# Patient Record
Sex: FEMALE | Race: WHITE | Employment: OTHER | URBAN - METROPOLITAN AREA
[De-identification: names, ages, dates, MRNs, and addresses within clinical notes are randomized per-mention and may not be internally consistent; named-entity substitution may affect disease eponyms.]

---

## 2018-03-12 DIAGNOSIS — M43.10 DEGENERATIVE SPONDYLOLISTHESIS: ICD-10-CM

## 2018-03-12 DIAGNOSIS — M41.50 DEGENERATIVE SCOLIOSIS: Primary | ICD-10-CM

## 2018-03-12 DIAGNOSIS — G95.19 NEUROGENIC CLAUDICATION (HCC): ICD-10-CM

## 2018-03-12 DIAGNOSIS — Q76.49 SPINAL DEFORMITY: ICD-10-CM

## 2018-03-27 ENCOUNTER — PREP FOR PROCEDURE (OUTPATIENT)
Dept: NEUROSURGERY | Age: 78
End: 2018-03-27

## 2018-03-27 DIAGNOSIS — M48.062 SPINAL STENOSIS OF LUMBAR REGION WITH NEUROGENIC CLAUDICATION: Primary | ICD-10-CM

## 2018-03-27 RX ORDER — SODIUM CHLORIDE 9 MG/ML
INJECTION, SOLUTION INTRAVENOUS CONTINUOUS
Status: CANCELLED | OUTPATIENT
Start: 2018-03-27

## 2018-03-27 RX ORDER — SODIUM CHLORIDE 0.9 % (FLUSH) 0.9 %
10 SYRINGE (ML) INJECTION PRN
Status: CANCELLED | OUTPATIENT
Start: 2018-03-27

## 2018-03-27 RX ORDER — SODIUM CHLORIDE 0.9 % (FLUSH) 0.9 %
10 SYRINGE (ML) INJECTION EVERY 12 HOURS SCHEDULED
Status: CANCELLED | OUTPATIENT
Start: 2018-03-27

## 2018-04-10 ENCOUNTER — ANESTHESIA EVENT (OUTPATIENT)
Dept: OPERATING ROOM | Age: 78
DRG: 453 | End: 2018-04-10
Payer: MEDICARE

## 2018-04-10 ENCOUNTER — HOSPITAL ENCOUNTER (OUTPATIENT)
Dept: PREADMISSION TESTING | Age: 78
Discharge: HOME | End: 2018-04-10
Payer: MEDICARE

## 2018-04-10 ENCOUNTER — HOSPITAL ENCOUNTER (OUTPATIENT)
Dept: GENERAL RADIOLOGY | Age: 78
Discharge: HOME | End: 2018-04-12
Payer: MEDICARE

## 2018-04-10 VITALS
OXYGEN SATURATION: 97 % | DIASTOLIC BLOOD PRESSURE: 83 MMHG | TEMPERATURE: 97.7 F | HEART RATE: 85 BPM | BODY MASS INDEX: 23.74 KG/M2 | HEIGHT: 63 IN | RESPIRATION RATE: 18 BRPM | WEIGHT: 134 LBS | SYSTOLIC BLOOD PRESSURE: 126 MMHG

## 2018-04-10 DIAGNOSIS — Z01.812 PRE-OPERATIVE LABORATORY EXAMINATION: Primary | ICD-10-CM

## 2018-04-10 DIAGNOSIS — M48.062 SPINAL STENOSIS OF LUMBAR REGION WITH NEUROGENIC CLAUDICATION: ICD-10-CM

## 2018-04-10 LAB
ABO/RH: NORMAL
ANION GAP SERPL CALCULATED.3IONS-SCNC: 18 MMOL/L (ref 7–16)
ANTIBODY SCREEN: NORMAL
BACTERIA: ABNORMAL /HPF
BILIRUBIN URINE: NEGATIVE
BLOOD, URINE: NEGATIVE
BUN BLDV-MCNC: 33 MG/DL (ref 8–23)
CALCIUM SERPL-MCNC: 10.3 MG/DL (ref 8.6–10.2)
CHLORIDE BLD-SCNC: 100 MMOL/L (ref 98–107)
CLARITY: CLEAR
CO2: 20 MMOL/L (ref 22–29)
COLOR: YELLOW
CREAT SERPL-MCNC: 1.2 MG/DL (ref 0.5–1)
GFR AFRICAN AMERICAN: 53
GFR NON-AFRICAN AMERICAN: 44 ML/MIN/1.73
GLUCOSE BLD-MCNC: 106 MG/DL (ref 74–109)
GLUCOSE URINE: NEGATIVE MG/DL
HCT VFR BLD CALC: 31.5 % (ref 34–48)
HEMOGLOBIN: 10.1 G/DL (ref 11.5–15.5)
KETONES, URINE: NEGATIVE MG/DL
LEUKOCYTE ESTERASE, URINE: ABNORMAL
MCH RBC QN AUTO: 29.4 PG (ref 26–35)
MCHC RBC AUTO-ENTMCNC: 32.1 % (ref 32–34.5)
MCV RBC AUTO: 91.6 FL (ref 80–99.9)
NITRITE, URINE: NEGATIVE
PDW BLD-RTO: 14.8 FL (ref 11.5–15)
PH UA: 5 (ref 5–9)
PLATELET # BLD: 315 E9/L (ref 130–450)
PMV BLD AUTO: 8.9 FL (ref 7–12)
POTASSIUM REFLEX MAGNESIUM: 4.8 MMOL/L (ref 3.5–5)
PROTEIN UA: NEGATIVE MG/DL
RBC # BLD: 3.44 E12/L (ref 3.5–5.5)
RBC UA: ABNORMAL /HPF (ref 0–2)
RENAL EPITHELIAL, UA: ABNORMAL /HPF
SODIUM BLD-SCNC: 138 MMOL/L (ref 132–146)
SPECIFIC GRAVITY UA: 1.02 (ref 1–1.03)
UROBILINOGEN, URINE: 0.2 E.U./DL
WBC # BLD: 9.4 E9/L (ref 4.5–11.5)
WBC UA: ABNORMAL /HPF (ref 0–5)

## 2018-04-10 PROCEDURE — 80048 BASIC METABOLIC PNL TOTAL CA: CPT

## 2018-04-10 PROCEDURE — 36415 COLL VENOUS BLD VENIPUNCTURE: CPT

## 2018-04-10 PROCEDURE — 85027 COMPLETE CBC AUTOMATED: CPT

## 2018-04-10 PROCEDURE — 86920 COMPATIBILITY TEST SPIN: CPT

## 2018-04-10 PROCEDURE — 86901 BLOOD TYPING SEROLOGIC RH(D): CPT

## 2018-04-10 PROCEDURE — 87081 CULTURE SCREEN ONLY: CPT

## 2018-04-10 PROCEDURE — 86850 RBC ANTIBODY SCREEN: CPT

## 2018-04-10 PROCEDURE — 71046 X-RAY EXAM CHEST 2 VIEWS: CPT

## 2018-04-10 PROCEDURE — 81001 URINALYSIS AUTO W/SCOPE: CPT

## 2018-04-10 PROCEDURE — 86900 BLOOD TYPING SEROLOGIC ABO: CPT

## 2018-04-10 RX ORDER — BIMATOPROST 0.01 %
1 DROPS OPHTHALMIC (EYE) NIGHTLY
Refills: 11 | COMMUNITY
Start: 2018-02-26

## 2018-04-10 RX ORDER — KETOCONAZOLE 20 MG/ML
1 SHAMPOO TOPICAL
Refills: 0 | COMMUNITY
Start: 2018-01-29

## 2018-04-10 RX ORDER — BRIMONIDINE TARTRATE/TIMOLOL 0.2%-0.5%
1 DROPS OPHTHALMIC (EYE) 2 TIMES DAILY
Refills: 4 | COMMUNITY
Start: 2018-02-26

## 2018-04-10 RX ORDER — MELOXICAM 15 MG/1
15 TABLET ORAL DAILY
Refills: 3 | Status: ON HOLD | COMMUNITY
Start: 2018-03-11 | End: 2018-04-20 | Stop reason: HOSPADM

## 2018-04-10 ASSESSMENT — PAIN DESCRIPTION - DESCRIPTORS: DESCRIPTORS: CONSTANT;SHOOTING

## 2018-04-10 ASSESSMENT — PAIN DESCRIPTION - ORIENTATION: ORIENTATION: LOWER;RIGHT;LEFT

## 2018-04-10 ASSESSMENT — PAIN DESCRIPTION - PAIN TYPE: TYPE: CHRONIC PAIN

## 2018-04-10 ASSESSMENT — PAIN DESCRIPTION - DIRECTION: RADIATING_TOWARDS: LEGS

## 2018-04-10 ASSESSMENT — PAIN DESCRIPTION - LOCATION: LOCATION: BACK

## 2018-04-10 ASSESSMENT — PAIN SCALES - GENERAL: PAINLEVEL_OUTOF10: 5

## 2018-04-10 ASSESSMENT — PAIN DESCRIPTION - PROGRESSION: CLINICAL_PROGRESSION: GRADUALLY WORSENING

## 2018-04-10 ASSESSMENT — PAIN DESCRIPTION - FREQUENCY: FREQUENCY: CONTINUOUS

## 2018-04-11 LAB — MRSA CULTURE ONLY: NORMAL

## 2018-04-17 ENCOUNTER — HOSPITAL ENCOUNTER (INPATIENT)
Age: 78
LOS: 13 days | Discharge: NURSING, REHAB OR LONG-TERM CARE | DRG: 453 | End: 2018-04-30
Attending: NEUROLOGICAL SURGERY | Admitting: NEUROLOGICAL SURGERY
Payer: MEDICARE

## 2018-04-17 ENCOUNTER — APPOINTMENT (OUTPATIENT)
Dept: GENERAL RADIOLOGY | Age: 78
DRG: 453 | End: 2018-04-17
Attending: NEUROLOGICAL SURGERY
Payer: MEDICARE

## 2018-04-17 ENCOUNTER — ANESTHESIA (OUTPATIENT)
Dept: OPERATING ROOM | Age: 78
DRG: 453 | End: 2018-04-17
Payer: MEDICARE

## 2018-04-17 VITALS — RESPIRATION RATE: 23 BRPM | TEMPERATURE: 98.2 F | OXYGEN SATURATION: 100 %

## 2018-04-17 DIAGNOSIS — M54.9 BACK PAIN, UNSPECIFIED BACK LOCATION, UNSPECIFIED BACK PAIN LATERALITY, UNSPECIFIED CHRONICITY: ICD-10-CM

## 2018-04-17 PROBLEM — M48.062 LUMBAR STENOSIS WITH NEUROGENIC CLAUDICATION: Status: ACTIVE | Noted: 2018-04-17

## 2018-04-17 LAB
B.E.: -4.6 MMOL/L (ref -3–0)
B.E.: -6.7 MMOL/L (ref -3–0)
B.E.: -7.6 MMOL/L (ref -3–0)
B.E.: -8 MMOL/L (ref -3–0)
BLOOD BANK DISPENSE STATUS: NORMAL
BLOOD BANK DISPENSE STATUS: NORMAL
BLOOD BANK PRODUCT CODE: NORMAL
BLOOD BANK PRODUCT CODE: NORMAL
BPU ID: NORMAL
BPU ID: NORMAL
CARDIOPULMONARY BYPASS: NO
DESCRIPTION BLOOD BANK: NORMAL
DESCRIPTION BLOOD BANK: NORMAL
DEVICE: ABNORMAL
FIBRINOGEN: 370 MG/DL (ref 225–540)
FIO2 ARTERIAL: 60
HCO3 ARTERIAL: 17.9 MMOL/L (ref 22–26)
HCO3 ARTERIAL: 18.2 MMOL/L (ref 22–26)
HCO3 ARTERIAL: 18.5 MMOL/L (ref 22–26)
HCO3 ARTERIAL: 20.4 MMOL/L (ref 22–26)
HCT,ARTERIAL: 22 % (ref 34–48)
HCT,ARTERIAL: 23 % (ref 34–48)
HCT,ARTERIAL: 29 % (ref 34–48)
HCT,ARTERIAL: 29 % (ref 34–48)
HGB, ARTERIAL: 7.4 G/DL (ref 11.5–15.5)
HGB, ARTERIAL: 8 G/DL (ref 11.5–15.5)
HGB, ARTERIAL: 9.7 G/DL (ref 11.5–15.5)
HGB, ARTERIAL: 9.8 G/DL (ref 11.5–15.5)
INR BLD: 1.2
O2 SATURATION: 99.9 % (ref 92–98.5)
OPERATOR ID: ABNORMAL
PCO2 ARTERIAL: 35 MMHG (ref 35–45)
PCO2 ARTERIAL: 36.1 MMHG (ref 35–45)
PCO2 ARTERIAL: 37.2 MMHG (ref 35–45)
PCO2 ARTERIAL: 37.2 MMHG (ref 35–45)
PH BLOOD GAS: 7.29 (ref 7.35–7.45)
PH BLOOD GAS: 7.3 (ref 7.35–7.45)
PH BLOOD GAS: 7.33 (ref 7.35–7.45)
PH BLOOD GAS: 7.36 (ref 7.35–7.45)
PO2 ARTERIAL: 333.3 MMHG (ref 60–80)
PO2 ARTERIAL: 333.4 MMHG (ref 60–80)
PO2 ARTERIAL: 340.2 MMHG (ref 60–80)
PO2 ARTERIAL: 343.1 MMHG (ref 60–80)
POTASSIUM SERPL-SCNC: 3.9 MMOL/L (ref 3.5–5.5)
POTASSIUM SERPL-SCNC: 4.1 MMOL/L (ref 3.5–5.5)
POTASSIUM SERPL-SCNC: 4.3 MMOL/L (ref 3.5–5.5)
POTASSIUM SERPL-SCNC: 4.4 MMOL/L (ref 3.5–5.5)
PROTHROMBIN TIME: 13.3 SEC (ref 9.3–12.4)
SOURCE, BLOOD GAS: ABNORMAL

## 2018-04-17 PROCEDURE — 95939 C MOTOR EVOKED UPR&LWR LIMBS: CPT | Performed by: AUDIOLOGIST

## 2018-04-17 PROCEDURE — 82803 BLOOD GASES ANY COMBINATION: CPT

## 2018-04-17 PROCEDURE — C1713 ANCHOR/SCREW BN/BN,TIS/BN: HCPCS | Performed by: NEUROLOGICAL SURGERY

## 2018-04-17 PROCEDURE — 0RG7071 FUSION OF 2 TO 7 THORACIC VERTEBRAL JOINTS WITH AUTOLOGOUS TISSUE SUBSTITUTE, POSTERIOR APPROACH, POSTERIOR COLUMN, OPEN APPROACH: ICD-10-PCS | Performed by: NEUROLOGICAL SURGERY

## 2018-04-17 PROCEDURE — 4A11X4G MONITORING OF PERIPHERAL NERVOUS ELECTRICAL ACTIVITY, INTRAOPERATIVE, EXTERNAL APPROACH: ICD-10-PCS | Performed by: NEUROLOGICAL SURGERY

## 2018-04-17 PROCEDURE — 36415 COLL VENOUS BLD VENIPUNCTURE: CPT

## 2018-04-17 PROCEDURE — 94002 VENT MGMT INPAT INIT DAY: CPT

## 2018-04-17 PROCEDURE — 2580000003 HC RX 258: Performed by: PHYSICIAN ASSISTANT

## 2018-04-17 PROCEDURE — 22848 INSERT PELV FIXATION DEVICE: CPT | Performed by: PHYSICIAN ASSISTANT

## 2018-04-17 PROCEDURE — 36620 INSERTION CATHETER ARTERY: CPT

## 2018-04-17 PROCEDURE — 6360000002 HC RX W HCPCS: Performed by: ANESTHESIOLOGY

## 2018-04-17 PROCEDURE — L8699 PROSTHETIC IMPLANT NOS: HCPCS | Performed by: NEUROLOGICAL SURGERY

## 2018-04-17 PROCEDURE — 0SG3071 FUSION OF LUMBOSACRAL JOINT WITH AUTOLOGOUS TISSUE SUBSTITUTE, POSTERIOR APPROACH, POSTERIOR COLUMN, OPEN APPROACH: ICD-10-PCS | Performed by: NEUROLOGICAL SURGERY

## 2018-04-17 PROCEDURE — P9041 ALBUMIN (HUMAN),5%, 50ML: HCPCS

## 2018-04-17 PROCEDURE — 2500000003 HC RX 250 WO HCPCS

## 2018-04-17 PROCEDURE — 6360000002 HC RX W HCPCS: Performed by: NEUROLOGICAL SURGERY

## 2018-04-17 PROCEDURE — 22214 INCIS 1 VERTEBRAL SEG LUMBAR: CPT | Performed by: NEUROLOGICAL SURGERY

## 2018-04-17 PROCEDURE — 0QB30ZZ EXCISION OF LEFT PELVIC BONE, OPEN APPROACH: ICD-10-PCS | Performed by: NEUROLOGICAL SURGERY

## 2018-04-17 PROCEDURE — 61783 SCAN PROC SPINAL: CPT | Performed by: NEUROLOGICAL SURGERY

## 2018-04-17 PROCEDURE — 7100000001 HC PACU RECOVERY - ADDTL 15 MIN: Performed by: NEUROLOGICAL SURGERY

## 2018-04-17 PROCEDURE — 88304 TISSUE EXAM BY PATHOLOGIST: CPT

## 2018-04-17 PROCEDURE — 0SB20ZZ EXCISION OF LUMBAR VERTEBRAL DISC, OPEN APPROACH: ICD-10-PCS | Performed by: NEUROLOGICAL SURGERY

## 2018-04-17 PROCEDURE — 7100000000 HC PACU RECOVERY - FIRST 15 MIN: Performed by: NEUROLOGICAL SURGERY

## 2018-04-17 PROCEDURE — 6360000002 HC RX W HCPCS

## 2018-04-17 PROCEDURE — 0SG10AJ FUSION OF 2 OR MORE LUMBAR VERTEBRAL JOINTS WITH INTERBODY FUSION DEVICE, POSTERIOR APPROACH, ANTERIOR COLUMN, OPEN APPROACH: ICD-10-PCS | Performed by: NEUROLOGICAL SURGERY

## 2018-04-17 PROCEDURE — 63017 REMOVE SPINE LAMINA >2 LMBR: CPT | Performed by: NEUROLOGICAL SURGERY

## 2018-04-17 PROCEDURE — 22843 INSERT SPINE FIXATION DEVICE: CPT | Performed by: PHYSICIAN ASSISTANT

## 2018-04-17 PROCEDURE — 63017 REMOVE SPINE LAMINA >2 LMBR: CPT | Performed by: PHYSICIAN ASSISTANT

## 2018-04-17 PROCEDURE — 85384 FIBRINOGEN ACTIVITY: CPT

## 2018-04-17 PROCEDURE — 22853 INSJ BIOMECHANICAL DEVICE: CPT | Performed by: NEUROLOGICAL SURGERY

## 2018-04-17 PROCEDURE — 0QH304Z INSERTION OF INTERNAL FIXATION DEVICE INTO LEFT PELVIC BONE, OPEN APPROACH: ICD-10-PCS | Performed by: NEUROLOGICAL SURGERY

## 2018-04-17 PROCEDURE — 76000 FLUOROSCOPY <1 HR PHYS/QHP: CPT

## 2018-04-17 PROCEDURE — 3600000002 HC SURGERY LEVEL 2 BASE: Performed by: NEUROLOGICAL SURGERY

## 2018-04-17 PROCEDURE — 6370000000 HC RX 637 (ALT 250 FOR IP): Performed by: NEUROLOGICAL SURGERY

## 2018-04-17 PROCEDURE — 3700000000 HC ANESTHESIA ATTENDED CARE: Performed by: NEUROLOGICAL SURGERY

## 2018-04-17 PROCEDURE — 95938 SOMATOSENSORY TESTING: CPT | Performed by: AUDIOLOGIST

## 2018-04-17 PROCEDURE — 22214 INCIS 1 VERTEBRAL SEG LUMBAR: CPT | Performed by: PHYSICIAN ASSISTANT

## 2018-04-17 PROCEDURE — 85610 PROTHROMBIN TIME: CPT

## 2018-04-17 PROCEDURE — 22848 INSERT PELV FIXATION DEVICE: CPT | Performed by: NEUROLOGICAL SURGERY

## 2018-04-17 PROCEDURE — 2720000010 HC SURG SUPPLY STERILE: Performed by: NEUROLOGICAL SURGERY

## 2018-04-17 PROCEDURE — 22853 INSJ BIOMECHANICAL DEVICE: CPT | Performed by: PHYSICIAN ASSISTANT

## 2018-04-17 PROCEDURE — 88311 DECALCIFY TISSUE: CPT

## 2018-04-17 PROCEDURE — 6360000002 HC RX W HCPCS: Performed by: PHYSICIAN ASSISTANT

## 2018-04-17 PROCEDURE — 22802 ARTHRD PST DFRM 7-12 VRT SGM: CPT | Performed by: PHYSICIAN ASSISTANT

## 2018-04-17 PROCEDURE — 2580000003 HC RX 258

## 2018-04-17 PROCEDURE — 3600000012 HC SURGERY LEVEL 2 ADDTL 15MIN: Performed by: NEUROLOGICAL SURGERY

## 2018-04-17 PROCEDURE — 8E0WXBF COMPUTER ASSISTED PROCEDURE OF TRUNK REGION, WITH FLUOROSCOPY: ICD-10-PCS | Performed by: NEUROLOGICAL SURGERY

## 2018-04-17 PROCEDURE — 22802 ARTHRD PST DFRM 7-12 VRT SGM: CPT | Performed by: NEUROLOGICAL SURGERY

## 2018-04-17 PROCEDURE — 22216 INCIS ADDL SPINE SEGMENT: CPT | Performed by: NEUROLOGICAL SURGERY

## 2018-04-17 PROCEDURE — 22843 INSERT SPINE FIXATION DEVICE: CPT | Performed by: NEUROLOGICAL SURGERY

## 2018-04-17 PROCEDURE — 2500000003 HC RX 250 WO HCPCS: Performed by: NEUROLOGICAL SURGERY

## 2018-04-17 PROCEDURE — 3700000001 HC ADD 15 MINUTES (ANESTHESIA): Performed by: NEUROLOGICAL SURGERY

## 2018-04-17 PROCEDURE — 1200000000 HC SEMI PRIVATE

## 2018-04-17 PROCEDURE — P9016 RBC LEUKOCYTES REDUCED: HCPCS

## 2018-04-17 PROCEDURE — 95940 IONM IN OPERATNG ROOM 15 MIN: CPT | Performed by: AUDIOLOGIST

## 2018-04-17 PROCEDURE — 0SG507Z FUSION OF SACROCOCCYGEAL JOINT WITH AUTOLOGOUS TISSUE SUBSTITUTE, OPEN APPROACH: ICD-10-PCS | Performed by: NEUROLOGICAL SURGERY

## 2018-04-17 PROCEDURE — 2709999900 HC NON-CHARGEABLE SUPPLY: Performed by: NEUROLOGICAL SURGERY

## 2018-04-17 PROCEDURE — 20937 SP BONE AGRFT MORSEL ADD-ON: CPT | Performed by: NEUROLOGICAL SURGERY

## 2018-04-17 PROCEDURE — 20937 SP BONE AGRFT MORSEL ADD-ON: CPT | Performed by: PHYSICIAN ASSISTANT

## 2018-04-17 PROCEDURE — 22216 INCIS ADDL SPINE SEGMENT: CPT | Performed by: PHYSICIAN ASSISTANT

## 2018-04-17 DEVICE — SCREW 55840005545 5.5/6 MAS 5.5X45 CC
Type: IMPLANTABLE DEVICE | Site: BACK | Status: FUNCTIONAL
Brand: CD HORIZON® SPINAL SYSTEM

## 2018-04-17 DEVICE — SCREW 55840005550 5.5/6 MAS 5.5X50 CC
Type: IMPLANTABLE DEVICE | Site: BACK | Status: FUNCTIONAL
Brand: CD HORIZON® SPINAL SYSTEM

## 2018-04-17 DEVICE — DBM 7509211 MAGNIFUSE 1 X 10CM
Type: IMPLANTABLE DEVICE | Site: BACK | Status: FUNCTIONAL
Brand: MAGNIFUSE® BONE GRAFT

## 2018-04-17 DEVICE — CROSSLINK 811-322 LP MLTSP PL L 1.752.15
Type: IMPLANTABLE DEVICE | Site: BACK | Status: FUNCTIONAL
Brand: TSRH® SPINAL SYSTEM

## 2018-04-17 DEVICE — ROD 1554200500 5.5 TIAL NS STRT LN 500MM
Type: IMPLANTABLE DEVICE | Site: BACK | Status: FUNCTIONAL
Brand: CD HORIZON® SPINAL SYSTEM

## 2018-04-17 DEVICE — SPACER 4011022 12 DEG 10X22
Type: IMPLANTABLE DEVICE | Site: BACK | Status: FUNCTIONAL
Brand: CAPSTONE CONTROL™ SPINAL SYSTEM

## 2018-04-17 DEVICE — SCREW 55840006545 5.5/6 MAS 6.5X45 CC .
Type: IMPLANTABLE DEVICE | Site: BACK | Status: FUNCTIONAL
Brand: CD HORIZON® SPINAL SYSTEM

## 2018-04-17 DEVICE — SCREW 55840005540 5.5/6 MAS 5.5X40 CC
Type: IMPLANTABLE DEVICE | Site: BACK | Status: FUNCTIONAL
Brand: CD HORIZON® SPINAL SYSTEM

## 2018-04-17 DEVICE — CROSSLINK 811-307 TI 5.5MM 1.375 INCH
Type: IMPLANTABLE DEVICE | Site: BACK | Status: FUNCTIONAL
Brand: TSRH® SPINAL SYSTEM

## 2018-04-17 RX ORDER — LIDOCAINE HYDROCHLORIDE 20 MG/ML
INJECTION, SOLUTION INFILTRATION; PERINEURAL PRN
Status: DISCONTINUED | OUTPATIENT
Start: 2018-04-17 | End: 2018-04-17 | Stop reason: SDUPTHER

## 2018-04-17 RX ORDER — HYDROMORPHONE HCL 110MG/55ML
0.25 PATIENT CONTROLLED ANALGESIA SYRINGE INTRAVENOUS EVERY 5 MIN PRN
Status: DISCONTINUED | OUTPATIENT
Start: 2018-04-17 | End: 2018-04-17

## 2018-04-17 RX ORDER — LATANOPROST 50 UG/ML
1 SOLUTION/ DROPS OPHTHALMIC NIGHTLY
Status: DISCONTINUED | OUTPATIENT
Start: 2018-04-17 | End: 2018-04-30 | Stop reason: HOSPADM

## 2018-04-17 RX ORDER — OXYCODONE HYDROCHLORIDE AND ACETAMINOPHEN 5; 325 MG/1; MG/1
1 TABLET ORAL EVERY 4 HOURS PRN
Status: DISCONTINUED | OUTPATIENT
Start: 2018-04-17 | End: 2018-04-30 | Stop reason: HOSPADM

## 2018-04-17 RX ORDER — SODIUM CHLORIDE 9 MG/ML
INJECTION, SOLUTION INTRAVENOUS CONTINUOUS
Status: DISCONTINUED | OUTPATIENT
Start: 2018-04-17 | End: 2018-04-17

## 2018-04-17 RX ORDER — MIDAZOLAM HYDROCHLORIDE 1 MG/ML
INJECTION INTRAMUSCULAR; INTRAVENOUS PRN
Status: DISCONTINUED | OUTPATIENT
Start: 2018-04-17 | End: 2018-04-17 | Stop reason: SDUPTHER

## 2018-04-17 RX ORDER — LIDOCAINE HYDROCHLORIDE AND EPINEPHRINE 10; 10 MG/ML; UG/ML
INJECTION, SOLUTION INFILTRATION; PERINEURAL PRN
Status: DISCONTINUED | OUTPATIENT
Start: 2018-04-17 | End: 2018-04-17

## 2018-04-17 RX ORDER — HYDROMORPHONE HCL 110MG/55ML
0.5 PATIENT CONTROLLED ANALGESIA SYRINGE INTRAVENOUS
Status: DISCONTINUED | OUTPATIENT
Start: 2018-04-17 | End: 2018-04-30 | Stop reason: HOSPADM

## 2018-04-17 RX ORDER — HYDRALAZINE HYDROCHLORIDE 20 MG/ML
5 INJECTION INTRAMUSCULAR; INTRAVENOUS EVERY 10 MIN PRN
Status: DISCONTINUED | OUTPATIENT
Start: 2018-04-17 | End: 2018-04-17

## 2018-04-17 RX ORDER — HYDROMORPHONE HCL 110MG/55ML
0.25 PATIENT CONTROLLED ANALGESIA SYRINGE INTRAVENOUS
Status: DISCONTINUED | OUTPATIENT
Start: 2018-04-17 | End: 2018-04-30 | Stop reason: HOSPADM

## 2018-04-17 RX ORDER — ONDANSETRON 2 MG/ML
INJECTION INTRAMUSCULAR; INTRAVENOUS PRN
Status: DISCONTINUED | OUTPATIENT
Start: 2018-04-17 | End: 2018-04-17 | Stop reason: SDUPTHER

## 2018-04-17 RX ORDER — CETIRIZINE HYDROCHLORIDE 10 MG/1
5 TABLET ORAL DAILY
Status: DISCONTINUED | OUTPATIENT
Start: 2018-04-18 | End: 2018-04-30 | Stop reason: HOSPADM

## 2018-04-17 RX ORDER — PROMETHAZINE HYDROCHLORIDE 25 MG/ML
6.25 INJECTION, SOLUTION INTRAMUSCULAR; INTRAVENOUS
Status: DISCONTINUED | OUTPATIENT
Start: 2018-04-17 | End: 2018-04-17

## 2018-04-17 RX ORDER — 0.9 % SODIUM CHLORIDE 0.9 %
250 INTRAVENOUS SOLUTION INTRAVENOUS ONCE
Status: DISCONTINUED | OUTPATIENT
Start: 2018-04-17 | End: 2018-04-17

## 2018-04-17 RX ORDER — LABETALOL HYDROCHLORIDE 5 MG/ML
5 INJECTION, SOLUTION INTRAVENOUS EVERY 10 MIN PRN
Status: DISCONTINUED | OUTPATIENT
Start: 2018-04-17 | End: 2018-04-17

## 2018-04-17 RX ORDER — ACETAMINOPHEN 325 MG/1
650 TABLET ORAL EVERY 4 HOURS PRN
Status: DISCONTINUED | OUTPATIENT
Start: 2018-04-17 | End: 2018-04-21 | Stop reason: SDUPTHER

## 2018-04-17 RX ORDER — DIAZEPAM 5 MG/1
5 TABLET ORAL EVERY 6 HOURS PRN
Status: DISCONTINUED | OUTPATIENT
Start: 2018-04-17 | End: 2018-04-30 | Stop reason: HOSPADM

## 2018-04-17 RX ORDER — MEPERIDINE HYDROCHLORIDE 50 MG/ML
12.5 INJECTION INTRAMUSCULAR; INTRAVENOUS; SUBCUTANEOUS EVERY 5 MIN PRN
Status: DISCONTINUED | OUTPATIENT
Start: 2018-04-17 | End: 2018-04-17

## 2018-04-17 RX ORDER — OXYCODONE HYDROCHLORIDE AND ACETAMINOPHEN 5; 325 MG/1; MG/1
2 TABLET ORAL EVERY 4 HOURS PRN
Status: DISCONTINUED | OUTPATIENT
Start: 2018-04-17 | End: 2018-04-30 | Stop reason: HOSPADM

## 2018-04-17 RX ORDER — DOXEPIN HYDROCHLORIDE 50 MG/1
50 CAPSULE ORAL 4 TIMES DAILY
Status: DISCONTINUED | OUTPATIENT
Start: 2018-04-17 | End: 2018-04-30 | Stop reason: HOSPADM

## 2018-04-17 RX ORDER — DOCUSATE SODIUM 100 MG/1
100 CAPSULE, LIQUID FILLED ORAL 2 TIMES DAILY
Status: DISCONTINUED | OUTPATIENT
Start: 2018-04-17 | End: 2018-04-19

## 2018-04-17 RX ORDER — FENTANYL CITRATE 50 UG/ML
INJECTION, SOLUTION INTRAMUSCULAR; INTRAVENOUS PRN
Status: DISCONTINUED | OUTPATIENT
Start: 2018-04-17 | End: 2018-04-17 | Stop reason: SDUPTHER

## 2018-04-17 RX ORDER — SODIUM CHLORIDE 0.9 % (FLUSH) 0.9 %
10 SYRINGE (ML) INJECTION PRN
Status: DISCONTINUED | OUTPATIENT
Start: 2018-04-17 | End: 2018-04-30 | Stop reason: HOSPADM

## 2018-04-17 RX ORDER — ALBUMIN, HUMAN INJ 5% 5 %
SOLUTION INTRAVENOUS PRN
Status: DISCONTINUED | OUTPATIENT
Start: 2018-04-17 | End: 2018-04-17 | Stop reason: SDUPTHER

## 2018-04-17 RX ORDER — HYDROMORPHONE HCL 110MG/55ML
0.5 PATIENT CONTROLLED ANALGESIA SYRINGE INTRAVENOUS EVERY 5 MIN PRN
Status: COMPLETED | OUTPATIENT
Start: 2018-04-17 | End: 2018-04-17

## 2018-04-17 RX ORDER — DEXAMETHASONE SODIUM PHOSPHATE 10 MG/ML
INJECTION, SOLUTION INTRAMUSCULAR; INTRAVENOUS PRN
Status: DISCONTINUED | OUTPATIENT
Start: 2018-04-17 | End: 2018-04-17 | Stop reason: SDUPTHER

## 2018-04-17 RX ORDER — PROPOFOL 10 MG/ML
INJECTION, EMULSION INTRAVENOUS PRN
Status: DISCONTINUED | OUTPATIENT
Start: 2018-04-17 | End: 2018-04-17 | Stop reason: SDUPTHER

## 2018-04-17 RX ORDER — ROCURONIUM BROMIDE 10 MG/ML
INJECTION, SOLUTION INTRAVENOUS PRN
Status: DISCONTINUED | OUTPATIENT
Start: 2018-04-17 | End: 2018-04-17 | Stop reason: SDUPTHER

## 2018-04-17 RX ORDER — SODIUM CHLORIDE 0.9 % (FLUSH) 0.9 %
10 SYRINGE (ML) INJECTION EVERY 12 HOURS SCHEDULED
Status: DISCONTINUED | OUTPATIENT
Start: 2018-04-17 | End: 2018-04-17

## 2018-04-17 RX ORDER — SODIUM CHLORIDE 0.9 % (FLUSH) 0.9 %
10 SYRINGE (ML) INJECTION PRN
Status: DISCONTINUED | OUTPATIENT
Start: 2018-04-17 | End: 2018-04-17

## 2018-04-17 RX ORDER — SODIUM CHLORIDE 9 MG/ML
INJECTION, SOLUTION INTRAVENOUS CONTINUOUS PRN
Status: DISCONTINUED | OUTPATIENT
Start: 2018-04-17 | End: 2018-04-17 | Stop reason: SDUPTHER

## 2018-04-17 RX ORDER — BRIMONIDINE TARTRATE, TIMOLOL MALEATE 2; 5 MG/ML; MG/ML
1 SOLUTION/ DROPS OPHTHALMIC EVERY 12 HOURS
Status: DISCONTINUED | OUTPATIENT
Start: 2018-04-17 | End: 2018-04-18 | Stop reason: CLARIF

## 2018-04-17 RX ORDER — ONDANSETRON 2 MG/ML
4 INJECTION INTRAMUSCULAR; INTRAVENOUS EVERY 6 HOURS PRN
Status: DISCONTINUED | OUTPATIENT
Start: 2018-04-17 | End: 2018-04-30 | Stop reason: HOSPADM

## 2018-04-17 RX ORDER — PROPOFOL 10 MG/ML
INJECTION, EMULSION INTRAVENOUS CONTINUOUS PRN
Status: DISCONTINUED | OUTPATIENT
Start: 2018-04-17 | End: 2018-04-17 | Stop reason: SDUPTHER

## 2018-04-17 RX ORDER — LEVOTHYROXINE SODIUM 0.05 MG/1
50 TABLET ORAL DAILY
Status: DISCONTINUED | OUTPATIENT
Start: 2018-04-18 | End: 2018-04-30 | Stop reason: HOSPADM

## 2018-04-17 RX ORDER — SODIUM CHLORIDE 0.9 % (FLUSH) 0.9 %
10 SYRINGE (ML) INJECTION EVERY 12 HOURS SCHEDULED
Status: DISCONTINUED | OUTPATIENT
Start: 2018-04-17 | End: 2018-04-30 | Stop reason: HOSPADM

## 2018-04-17 RX ORDER — SODIUM CHLORIDE 9 MG/ML
INJECTION, SOLUTION INTRAVENOUS CONTINUOUS
Status: DISCONTINUED | OUTPATIENT
Start: 2018-04-17 | End: 2018-04-21

## 2018-04-17 RX ADMIN — Medication 10 ML: at 23:40

## 2018-04-17 RX ADMIN — SODIUM BICARBONATE 50 MEQ: 84 INJECTION INTRAVENOUS at 15:58

## 2018-04-17 RX ADMIN — FENTANYL CITRATE 50 MCG: 50 INJECTION, SOLUTION INTRAMUSCULAR; INTRAVENOUS at 08:11

## 2018-04-17 RX ADMIN — LIDOCAINE HYDROCHLORIDE 100 MG: 20 INJECTION, SOLUTION INFILTRATION; PERINEURAL at 08:03

## 2018-04-17 RX ADMIN — MIDAZOLAM HYDROCHLORIDE 2 MG: 1 INJECTION, SOLUTION INTRAMUSCULAR; INTRAVENOUS at 07:54

## 2018-04-17 RX ADMIN — SODIUM CHLORIDE: 9 INJECTION, SOLUTION INTRAVENOUS at 07:10

## 2018-04-17 RX ADMIN — ROCURONIUM BROMIDE 30 MG: 10 INJECTION, SOLUTION INTRAVENOUS at 09:02

## 2018-04-17 RX ADMIN — CEFAZOLIN SODIUM 2 G: 2 SOLUTION INTRAVENOUS at 11:30

## 2018-04-17 RX ADMIN — CEFAZOLIN SODIUM 2 G: 2 SOLUTION INTRAVENOUS at 15:30

## 2018-04-17 RX ADMIN — PROPOFOL 50 MG: 10 INJECTION, EMULSION INTRAVENOUS at 08:09

## 2018-04-17 RX ADMIN — PROPOFOL 150 MG: 10 INJECTION, EMULSION INTRAVENOUS at 08:03

## 2018-04-17 RX ADMIN — CEFAZOLIN SODIUM 2 G: 2 SOLUTION INTRAVENOUS at 23:35

## 2018-04-17 RX ADMIN — HYDROMORPHONE HYDROCHLORIDE 0.5 MG: 2 INJECTION INTRAMUSCULAR; INTRAVENOUS; SUBCUTANEOUS at 22:23

## 2018-04-17 RX ADMIN — SODIUM CHLORIDE: 9 INJECTION, SOLUTION INTRAVENOUS at 08:16

## 2018-04-17 RX ADMIN — Medication 10 ML: at 23:42

## 2018-04-17 RX ADMIN — Medication 25 MCG: at 08:05

## 2018-04-17 RX ADMIN — SODIUM CHLORIDE: 9 INJECTION, SOLUTION INTRAVENOUS at 08:20

## 2018-04-17 RX ADMIN — ALBUMIN (HUMAN) 500 ML: 12.5 INJECTION, SOLUTION INTRAVENOUS at 15:50

## 2018-04-17 RX ADMIN — HYDROMORPHONE HYDROCHLORIDE 0.5 MG: 2 INJECTION INTRAMUSCULAR; INTRAVENOUS; SUBCUTANEOUS at 22:14

## 2018-04-17 RX ADMIN — Medication 0.35 MCG/KG/HR: at 08:16

## 2018-04-17 RX ADMIN — PROPOFOL 100 MCG/KG/MIN: 10 INJECTION, EMULSION INTRAVENOUS at 08:05

## 2018-04-17 RX ADMIN — FENTANYL CITRATE 150 MCG: 50 INJECTION, SOLUTION INTRAMUSCULAR; INTRAVENOUS at 08:03

## 2018-04-17 RX ADMIN — ALBUMIN (HUMAN) 500 ML: 12.5 INJECTION, SOLUTION INTRAVENOUS at 10:11

## 2018-04-17 RX ADMIN — ONDANSETRON 4 MG: 2 INJECTION INTRAMUSCULAR; INTRAVENOUS at 17:35

## 2018-04-17 RX ADMIN — CEFAZOLIN SODIUM 2 G: 2 SOLUTION INTRAVENOUS at 07:54

## 2018-04-17 RX ADMIN — HYDROMORPHONE HYDROCHLORIDE 0.5 MG: 2 INJECTION INTRAMUSCULAR; INTRAVENOUS; SUBCUTANEOUS at 21:53

## 2018-04-17 RX ADMIN — HYDROMORPHONE HYDROCHLORIDE 0.5 MG: 2 INJECTION INTRAMUSCULAR; INTRAVENOUS; SUBCUTANEOUS at 22:28

## 2018-04-17 RX ADMIN — DEXAMETHASONE SODIUM PHOSPHATE 10 MG: 10 INJECTION, SOLUTION INTRAMUSCULAR; INTRAVENOUS at 08:15

## 2018-04-17 RX ADMIN — PHENYLEPHRINE HYDROCHLORIDE 10000 MCG: 10 INJECTION INTRAMUSCULAR; INTRAVENOUS; SUBCUTANEOUS at 14:10

## 2018-04-17 RX ADMIN — PHENYLEPHRINE HYDROCHLORIDE: 10 INJECTION INTRAMUSCULAR; INTRAVENOUS; SUBCUTANEOUS at 10:54

## 2018-04-17 ASSESSMENT — PULMONARY FUNCTION TESTS
PIF_VALUE: 2
PIF_VALUE: 18
PIF_VALUE: 20
PIF_VALUE: 19
PIF_VALUE: 19
PIF_VALUE: 20
PIF_VALUE: 20
PIF_VALUE: 18
PIF_VALUE: 20
PIF_VALUE: 21
PIF_VALUE: 19
PIF_VALUE: 20
PIF_VALUE: 20
PIF_VALUE: 18
PIF_VALUE: 19
PIF_VALUE: 20
PIF_VALUE: 18
PIF_VALUE: 21
PIF_VALUE: 21
PIF_VALUE: 19
PIF_VALUE: 22
PIF_VALUE: 18
PIF_VALUE: 19
PIF_VALUE: 20
PIF_VALUE: 19
PIF_VALUE: 19
PIF_VALUE: 21
PIF_VALUE: 20
PIF_VALUE: 19
PIF_VALUE: 17
PIF_VALUE: 19
PIF_VALUE: 21
PIF_VALUE: 21
PIF_VALUE: 20
PIF_VALUE: 18
PIF_VALUE: 19
PIF_VALUE: 18
PIF_VALUE: 21
PIF_VALUE: 20
PIF_VALUE: 19
PIF_VALUE: 21
PIF_VALUE: 19
PIF_VALUE: 21
PIF_VALUE: 19
PIF_VALUE: 21
PIF_VALUE: 23
PIF_VALUE: 12
PIF_VALUE: 21
PIF_VALUE: 18
PIF_VALUE: 1
PIF_VALUE: 22
PIF_VALUE: 18
PIF_VALUE: 19
PIF_VALUE: 19
PIF_VALUE: 1
PIF_VALUE: 20
PIF_VALUE: 18
PIF_VALUE: 20
PIF_VALUE: 20
PIF_VALUE: 19
PIF_VALUE: 18
PIF_VALUE: 21
PIF_VALUE: 20
PIF_VALUE: 1
PIF_VALUE: 19
PIF_VALUE: 18
PIF_VALUE: 21
PIF_VALUE: 21
PIF_VALUE: 20
PIF_VALUE: 19
PIF_VALUE: 18
PIF_VALUE: 18
PIF_VALUE: 20
PIF_VALUE: 18
PIF_VALUE: 21
PIF_VALUE: 21
PIF_VALUE: 18
PIF_VALUE: 21
PIF_VALUE: 18
PIF_VALUE: 19
PIF_VALUE: 0
PIF_VALUE: 19
PIF_VALUE: 0
PIF_VALUE: 21
PIF_VALUE: 20
PIF_VALUE: 22
PIF_VALUE: 21
PIF_VALUE: 19
PIF_VALUE: 22
PIF_VALUE: 21
PIF_VALUE: 18
PIF_VALUE: 19
PIF_VALUE: 21
PIF_VALUE: 20
PIF_VALUE: 19
PIF_VALUE: 21
PIF_VALUE: 21
PIF_VALUE: 20
PIF_VALUE: 19
PIF_VALUE: 21
PIF_VALUE: 20
PIF_VALUE: 19
PIF_VALUE: 21
PIF_VALUE: 20
PIF_VALUE: 20
PIF_VALUE: 21
PIF_VALUE: 22
PIF_VALUE: 21
PIF_VALUE: 20
PIF_VALUE: 17
PIF_VALUE: 20
PIF_VALUE: 18
PIF_VALUE: 19
PIF_VALUE: 20
PIF_VALUE: 18
PIF_VALUE: 21
PIF_VALUE: 21
PIF_VALUE: 19
PIF_VALUE: 20
PIF_VALUE: 18
PIF_VALUE: 19
PIF_VALUE: 18
PIF_VALUE: 20
PIF_VALUE: 19
PIF_VALUE: 19
PIF_VALUE: 18
PIF_VALUE: 11
PIF_VALUE: 20
PIF_VALUE: 21
PIF_VALUE: 17
PIF_VALUE: 19
PIF_VALUE: 11
PIF_VALUE: 21
PIF_VALUE: 19
PIF_VALUE: 20
PIF_VALUE: 18
PIF_VALUE: 21
PIF_VALUE: 20
PIF_VALUE: 20
PIF_VALUE: 19
PIF_VALUE: 18
PIF_VALUE: 20
PIF_VALUE: 21
PIF_VALUE: 17
PIF_VALUE: 19
PIF_VALUE: 18
PIF_VALUE: 18
PIF_VALUE: 19
PIF_VALUE: 20
PIF_VALUE: 19
PIF_VALUE: 17
PIF_VALUE: 19
PIF_VALUE: 21
PIF_VALUE: 18
PIF_VALUE: 19
PIF_VALUE: 18
PIF_VALUE: 20
PIF_VALUE: 19
PIF_VALUE: 19
PIF_VALUE: 20
PIF_VALUE: 20
PIF_VALUE: 19
PIF_VALUE: 18
PIF_VALUE: 19
PIF_VALUE: 20
PIF_VALUE: 19
PIF_VALUE: 21
PIF_VALUE: 17
PIF_VALUE: 18
PIF_VALUE: 20
PIF_VALUE: 22
PIF_VALUE: 20
PIF_VALUE: 20
PIF_VALUE: 19
PIF_VALUE: 18
PIF_VALUE: 21
PIF_VALUE: 21
PIF_VALUE: 11
PIF_VALUE: 20
PIF_VALUE: 17
PIF_VALUE: 20
PIF_VALUE: 20
PIF_VALUE: 19
PIF_VALUE: 1
PIF_VALUE: 20
PIF_VALUE: 19
PIF_VALUE: 18
PIF_VALUE: 20
PIF_VALUE: 20
PIF_VALUE: 19
PIF_VALUE: 18
PIF_VALUE: 19
PIF_VALUE: 20
PIF_VALUE: 21
PIF_VALUE: 18
PIF_VALUE: 20
PIF_VALUE: 21
PIF_VALUE: 18
PIF_VALUE: 0
PIF_VALUE: 21
PIF_VALUE: 17
PIF_VALUE: 1
PIF_VALUE: 20
PIF_VALUE: 19
PIF_VALUE: 18
PIF_VALUE: 20
PIF_VALUE: 20
PIF_VALUE: 19
PIF_VALUE: 19
PIF_VALUE: 21
PIF_VALUE: 19
PIF_VALUE: 20
PIF_VALUE: 19
PIF_VALUE: 20
PIF_VALUE: 21
PIF_VALUE: 19
PIF_VALUE: 21
PIF_VALUE: 19
PIF_VALUE: 18
PIF_VALUE: 20
PIF_VALUE: 2
PIF_VALUE: 12
PIF_VALUE: 19
PIF_VALUE: 18
PIF_VALUE: 21
PIF_VALUE: 21
PIF_VALUE: 19
PIF_VALUE: 19
PIF_VALUE: 22
PIF_VALUE: 21
PIF_VALUE: 20
PIF_VALUE: 18
PIF_VALUE: 19
PIF_VALUE: 21
PIF_VALUE: 19
PIF_VALUE: 21
PIF_VALUE: 19
PIF_VALUE: 2
PIF_VALUE: 20
PIF_VALUE: 21
PIF_VALUE: 16
PIF_VALUE: 20
PIF_VALUE: 19
PIF_VALUE: 21
PIF_VALUE: 20
PIF_VALUE: 21
PIF_VALUE: 17
PIF_VALUE: 2
PIF_VALUE: 19
PIF_VALUE: 20
PIF_VALUE: 16
PIF_VALUE: 18
PIF_VALUE: 20
PIF_VALUE: 19
PIF_VALUE: 21
PIF_VALUE: 20
PIF_VALUE: 19
PIF_VALUE: 20
PIF_VALUE: 18
PIF_VALUE: 20
PIF_VALUE: 21
PIF_VALUE: 23
PIF_VALUE: 18
PIF_VALUE: 18
PIF_VALUE: 21
PIF_VALUE: 20
PIF_VALUE: 21
PIF_VALUE: 21
PIF_VALUE: 20
PIF_VALUE: 20
PIF_VALUE: 18
PIF_VALUE: 16
PIF_VALUE: 19
PIF_VALUE: 18
PIF_VALUE: 21
PIF_VALUE: 18
PIF_VALUE: 20
PIF_VALUE: 19
PIF_VALUE: 18
PIF_VALUE: 21
PIF_VALUE: 20
PIF_VALUE: 19
PIF_VALUE: 19
PIF_VALUE: 16
PIF_VALUE: 20
PIF_VALUE: 18
PIF_VALUE: 23
PIF_VALUE: 20
PIF_VALUE: 20
PIF_VALUE: 14
PIF_VALUE: 18
PIF_VALUE: 20
PIF_VALUE: 1
PIF_VALUE: 19
PIF_VALUE: 20
PIF_VALUE: 21
PIF_VALUE: 18
PIF_VALUE: 18
PIF_VALUE: 21
PIF_VALUE: 17
PIF_VALUE: 18
PIF_VALUE: 19
PIF_VALUE: 20
PIF_VALUE: 18
PIF_VALUE: 19
PIF_VALUE: 18
PIF_VALUE: 21
PIF_VALUE: 22
PIF_VALUE: 21
PIF_VALUE: 0
PIF_VALUE: 23
PIF_VALUE: 15
PIF_VALUE: 21
PIF_VALUE: 19
PIF_VALUE: 20
PIF_VALUE: 0
PIF_VALUE: 21
PIF_VALUE: 19
PIF_VALUE: 21
PIF_VALUE: 19
PIF_VALUE: 19
PIF_VALUE: 21
PIF_VALUE: 20
PIF_VALUE: 21
PIF_VALUE: 20
PIF_VALUE: 18
PIF_VALUE: 19
PIF_VALUE: 18
PIF_VALUE: 20
PIF_VALUE: 18
PIF_VALUE: 20
PIF_VALUE: 19
PIF_VALUE: 17
PIF_VALUE: 18
PIF_VALUE: 18
PIF_VALUE: 21
PIF_VALUE: 22
PIF_VALUE: 20
PIF_VALUE: 19
PIF_VALUE: 17
PIF_VALUE: 21
PIF_VALUE: 19
PIF_VALUE: 21
PIF_VALUE: 19
PIF_VALUE: 19
PIF_VALUE: 18
PIF_VALUE: 20
PIF_VALUE: 19
PIF_VALUE: 18
PIF_VALUE: 19
PIF_VALUE: 1
PIF_VALUE: 20
PIF_VALUE: 19
PIF_VALUE: 20
PIF_VALUE: 21
PIF_VALUE: 19
PIF_VALUE: 21
PIF_VALUE: 20
PIF_VALUE: 19
PIF_VALUE: 18
PIF_VALUE: 18
PIF_VALUE: 19
PIF_VALUE: 19
PIF_VALUE: 18
PIF_VALUE: 20
PIF_VALUE: 19
PIF_VALUE: 13
PIF_VALUE: 20
PIF_VALUE: 19
PIF_VALUE: 18
PIF_VALUE: 17
PIF_VALUE: 18
PIF_VALUE: 19
PIF_VALUE: 20
PIF_VALUE: 18
PIF_VALUE: 18
PIF_VALUE: 20
PIF_VALUE: 20
PIF_VALUE: 18
PIF_VALUE: 19
PIF_VALUE: 2
PIF_VALUE: 0
PIF_VALUE: 20
PIF_VALUE: 17
PIF_VALUE: 19
PIF_VALUE: 19
PIF_VALUE: 35
PIF_VALUE: 19
PIF_VALUE: 17
PIF_VALUE: 18
PIF_VALUE: 19
PIF_VALUE: 18
PIF_VALUE: 20
PIF_VALUE: 21
PIF_VALUE: 18
PIF_VALUE: 20
PIF_VALUE: 21
PIF_VALUE: 19
PIF_VALUE: 20
PIF_VALUE: 19
PIF_VALUE: 18
PIF_VALUE: 26
PIF_VALUE: 18
PIF_VALUE: 20
PIF_VALUE: 19
PIF_VALUE: 19
PIF_VALUE: 21
PIF_VALUE: 18
PIF_VALUE: 20
PIF_VALUE: 20
PIF_VALUE: 19
PIF_VALUE: 18
PIF_VALUE: 19
PIF_VALUE: 20
PIF_VALUE: 18
PIF_VALUE: 21
PIF_VALUE: 20
PIF_VALUE: 19
PIF_VALUE: 24
PIF_VALUE: 21
PIF_VALUE: 20
PIF_VALUE: 18
PIF_VALUE: 2
PIF_VALUE: 18
PIF_VALUE: 20
PIF_VALUE: 21
PIF_VALUE: 18
PIF_VALUE: 19
PIF_VALUE: 19
PIF_VALUE: 20
PIF_VALUE: 21
PIF_VALUE: 18
PIF_VALUE: 20
PIF_VALUE: 19
PIF_VALUE: 18
PIF_VALUE: 20
PIF_VALUE: 17
PIF_VALUE: 20
PIF_VALUE: 19
PIF_VALUE: 20
PIF_VALUE: 21
PIF_VALUE: 16
PIF_VALUE: 20
PIF_VALUE: 17
PIF_VALUE: 19
PIF_VALUE: 20
PIF_VALUE: 21
PIF_VALUE: 12
PIF_VALUE: 21
PIF_VALUE: 20
PIF_VALUE: 21
PIF_VALUE: 17
PIF_VALUE: 19
PIF_VALUE: 18
PIF_VALUE: 19
PIF_VALUE: 20
PIF_VALUE: 19
PIF_VALUE: 1
PIF_VALUE: 19
PIF_VALUE: 20
PIF_VALUE: 21
PIF_VALUE: 19
PIF_VALUE: 18
PIF_VALUE: 17
PIF_VALUE: 20
PIF_VALUE: 19
PIF_VALUE: 18
PIF_VALUE: 19
PIF_VALUE: 18
PIF_VALUE: 20
PIF_VALUE: 18
PIF_VALUE: 18
PIF_VALUE: 1
PIF_VALUE: 18
PIF_VALUE: 18
PIF_VALUE: 0
PIF_VALUE: 21
PIF_VALUE: 20
PIF_VALUE: 20
PIF_VALUE: 17
PIF_VALUE: 19
PIF_VALUE: 22
PIF_VALUE: 18
PIF_VALUE: 18
PIF_VALUE: 20
PIF_VALUE: 19
PIF_VALUE: 18
PIF_VALUE: 20
PIF_VALUE: 20
PIF_VALUE: 19
PIF_VALUE: 20
PIF_VALUE: 20
PIF_VALUE: 18
PIF_VALUE: 19
PIF_VALUE: 20
PIF_VALUE: 18
PIF_VALUE: 18
PIF_VALUE: 21
PIF_VALUE: 20
PIF_VALUE: 19
PIF_VALUE: 19
PIF_VALUE: 20
PIF_VALUE: 20
PIF_VALUE: 19
PIF_VALUE: 19
PIF_VALUE: 18
PIF_VALUE: 20
PIF_VALUE: 21
PIF_VALUE: 19
PIF_VALUE: 18
PIF_VALUE: 19
PIF_VALUE: 20
PIF_VALUE: 3
PIF_VALUE: 18
PIF_VALUE: 18
PIF_VALUE: 21
PIF_VALUE: 18
PIF_VALUE: 19
PIF_VALUE: 20
PIF_VALUE: 18
PIF_VALUE: 18
PIF_VALUE: 19
PIF_VALUE: 21
PIF_VALUE: 0
PIF_VALUE: 20
PIF_VALUE: 15
PIF_VALUE: 19
PIF_VALUE: 21
PIF_VALUE: 18
PIF_VALUE: 20
PIF_VALUE: 20
PIF_VALUE: 18
PIF_VALUE: 17
PIF_VALUE: 21
PIF_VALUE: 18
PIF_VALUE: 20
PIF_VALUE: 21
PIF_VALUE: 19
PIF_VALUE: 19
PIF_VALUE: 21
PIF_VALUE: 19
PIF_VALUE: 18
PIF_VALUE: 20
PIF_VALUE: 21
PIF_VALUE: 19
PIF_VALUE: 17
PIF_VALUE: 19
PIF_VALUE: 21
PIF_VALUE: 19
PIF_VALUE: 20
PIF_VALUE: 19
PIF_VALUE: 20
PIF_VALUE: 20
PIF_VALUE: 19
PIF_VALUE: 20
PIF_VALUE: 19
PIF_VALUE: 19
PIF_VALUE: 21
PIF_VALUE: 16
PIF_VALUE: 21
PIF_VALUE: 18
PIF_VALUE: 21
PIF_VALUE: 18
PIF_VALUE: 20
PIF_VALUE: 20
PIF_VALUE: 18
PIF_VALUE: 20
PIF_VALUE: 19

## 2018-04-17 ASSESSMENT — PAIN SCALES - GENERAL
PAINLEVEL_OUTOF10: 8
PAINLEVEL_OUTOF10: 2
PAINLEVEL_OUTOF10: 8
PAINLEVEL_OUTOF10: 8
PAINLEVEL_OUTOF10: 0
PAINLEVEL_OUTOF10: 2
PAINLEVEL_OUTOF10: 0
PAINLEVEL_OUTOF10: 7

## 2018-04-17 ASSESSMENT — PAIN DESCRIPTION - ORIENTATION
ORIENTATION: MID

## 2018-04-17 ASSESSMENT — PAIN DESCRIPTION - PAIN TYPE
TYPE: SURGICAL PAIN

## 2018-04-17 ASSESSMENT — PAIN DESCRIPTION - DESCRIPTORS
DESCRIPTORS: ACHING;DISCOMFORT

## 2018-04-17 ASSESSMENT — PAIN - FUNCTIONAL ASSESSMENT: PAIN_FUNCTIONAL_ASSESSMENT: 0-10

## 2018-04-17 ASSESSMENT — PAIN DESCRIPTION - LOCATION
LOCATION: BACK

## 2018-04-18 ENCOUNTER — APPOINTMENT (OUTPATIENT)
Dept: GENERAL RADIOLOGY | Age: 78
DRG: 453 | End: 2018-04-18
Attending: NEUROLOGICAL SURGERY
Payer: MEDICARE

## 2018-04-18 LAB
ANION GAP SERPL CALCULATED.3IONS-SCNC: 15 MMOL/L (ref 7–16)
BUN BLDV-MCNC: 14 MG/DL (ref 8–23)
CALCIUM SERPL-MCNC: 8.4 MG/DL (ref 8.6–10.2)
CHLORIDE BLD-SCNC: 107 MMOL/L (ref 98–107)
CO2: 22 MMOL/L (ref 22–29)
CREAT SERPL-MCNC: 0.6 MG/DL (ref 0.5–1)
GFR AFRICAN AMERICAN: >60
GFR NON-AFRICAN AMERICAN: >60 ML/MIN/1.73
GLUCOSE BLD-MCNC: 148 MG/DL (ref 74–109)
HCT VFR BLD CALC: 24.1 % (ref 34–48)
HEMOGLOBIN: 7.8 G/DL (ref 11.5–15.5)
MCH RBC QN AUTO: 28.6 PG (ref 26–35)
MCHC RBC AUTO-ENTMCNC: 32.4 % (ref 32–34.5)
MCV RBC AUTO: 88.3 FL (ref 80–99.9)
PDW BLD-RTO: 15.6 FL (ref 11.5–15)
PLATELET # BLD: 176 E9/L (ref 130–450)
PMV BLD AUTO: 9.5 FL (ref 7–12)
POTASSIUM SERPL-SCNC: 4.3 MMOL/L (ref 3.5–5)
RBC # BLD: 2.73 E12/L (ref 3.5–5.5)
SODIUM BLD-SCNC: 144 MMOL/L (ref 132–146)
WBC # BLD: 13.3 E9/L (ref 4.5–11.5)

## 2018-04-18 PROCEDURE — 1200000000 HC SEMI PRIVATE

## 2018-04-18 PROCEDURE — 72070 X-RAY EXAM THORAC SPINE 2VWS: CPT

## 2018-04-18 PROCEDURE — 2700000000 HC OXYGEN THERAPY PER DAY

## 2018-04-18 PROCEDURE — G8979 MOBILITY GOAL STATUS: HCPCS

## 2018-04-18 PROCEDURE — 97530 THERAPEUTIC ACTIVITIES: CPT

## 2018-04-18 PROCEDURE — 72100 X-RAY EXAM L-S SPINE 2/3 VWS: CPT

## 2018-04-18 PROCEDURE — G8978 MOBILITY CURRENT STATUS: HCPCS

## 2018-04-18 PROCEDURE — 6370000000 HC RX 637 (ALT 250 FOR IP): Performed by: STUDENT IN AN ORGANIZED HEALTH CARE EDUCATION/TRAINING PROGRAM

## 2018-04-18 PROCEDURE — 6360000002 HC RX W HCPCS

## 2018-04-18 PROCEDURE — G8988 SELF CARE GOAL STATUS: HCPCS

## 2018-04-18 PROCEDURE — 2500000003 HC RX 250 WO HCPCS

## 2018-04-18 PROCEDURE — 74018 RADEX ABDOMEN 1 VIEW: CPT

## 2018-04-18 PROCEDURE — 97162 PT EVAL MOD COMPLEX 30 MIN: CPT

## 2018-04-18 PROCEDURE — 2580000003 HC RX 258: Performed by: PHYSICIAN ASSISTANT

## 2018-04-18 PROCEDURE — 80048 BASIC METABOLIC PNL TOTAL CA: CPT

## 2018-04-18 PROCEDURE — G8987 SELF CARE CURRENT STATUS: HCPCS

## 2018-04-18 PROCEDURE — 6370000000 HC RX 637 (ALT 250 FOR IP): Performed by: PHYSICIAN ASSISTANT

## 2018-04-18 PROCEDURE — 36415 COLL VENOUS BLD VENIPUNCTURE: CPT

## 2018-04-18 PROCEDURE — 6370000000 HC RX 637 (ALT 250 FOR IP): Performed by: NEUROLOGICAL SURGERY

## 2018-04-18 PROCEDURE — 97166 OT EVAL MOD COMPLEX 45 MIN: CPT

## 2018-04-18 PROCEDURE — 6360000002 HC RX W HCPCS: Performed by: PHYSICIAN ASSISTANT

## 2018-04-18 PROCEDURE — 85027 COMPLETE CBC AUTOMATED: CPT

## 2018-04-18 RX ORDER — BRIMONIDINE TARTRATE 2 MG/ML
1 SOLUTION/ DROPS OPHTHALMIC 2 TIMES DAILY
Status: DISCONTINUED | OUTPATIENT
Start: 2018-04-18 | End: 2018-04-30 | Stop reason: HOSPADM

## 2018-04-18 RX ORDER — HYDROMORPHONE HCL 110MG/55ML
PATIENT CONTROLLED ANALGESIA SYRINGE INTRAVENOUS
Status: COMPLETED
Start: 2018-04-18 | End: 2018-04-18

## 2018-04-18 RX ORDER — BISACODYL 10 MG
10 SUPPOSITORY, RECTAL RECTAL 2 TIMES DAILY
Status: DISCONTINUED | OUTPATIENT
Start: 2018-04-18 | End: 2018-04-30 | Stop reason: HOSPADM

## 2018-04-18 RX ORDER — POLYETHYLENE GLYCOL 3350 17 G/17G
17 POWDER, FOR SOLUTION ORAL DAILY
Status: DISCONTINUED | OUTPATIENT
Start: 2018-04-19 | End: 2018-04-19

## 2018-04-18 RX ORDER — SENNA PLUS 8.6 MG/1
1 TABLET ORAL NIGHTLY
Status: DISCONTINUED | OUTPATIENT
Start: 2018-04-18 | End: 2018-04-30 | Stop reason: HOSPADM

## 2018-04-18 RX ORDER — TIMOLOL MALEATE 5 MG/ML
1 SOLUTION/ DROPS OPHTHALMIC 2 TIMES DAILY
Status: DISCONTINUED | OUTPATIENT
Start: 2018-04-18 | End: 2018-04-30 | Stop reason: HOSPADM

## 2018-04-18 RX ADMIN — HYDROMORPHONE HYDROCHLORIDE 0.5 MG: 2 INJECTION, SOLUTION INTRAMUSCULAR; INTRAVENOUS; SUBCUTANEOUS at 04:08

## 2018-04-18 RX ADMIN — DIAZEPAM 5 MG: 5 TABLET ORAL at 21:00

## 2018-04-18 RX ADMIN — Medication 10 ML: at 21:00

## 2018-04-18 RX ADMIN — BISACODYL 10 MG: 10 SUPPOSITORY RECTAL at 22:13

## 2018-04-18 RX ADMIN — TIMOLOL MALEATE 1 DROP: 5 SOLUTION OPHTHALMIC at 21:02

## 2018-04-18 RX ADMIN — LEVOTHYROXINE SODIUM 50 MCG: 50 TABLET ORAL at 06:26

## 2018-04-18 RX ADMIN — OXYCODONE HYDROCHLORIDE AND ACETAMINOPHEN 2 TABLET: 5; 325 TABLET ORAL at 10:56

## 2018-04-18 RX ADMIN — DOXEPIN HYDROCHLORIDE 50 MG: 50 CAPSULE ORAL at 12:41

## 2018-04-18 RX ADMIN — DIAZEPAM 5 MG: 5 TABLET ORAL at 05:46

## 2018-04-18 RX ADMIN — MAGESIUM CITRATE 296 ML: 1.75 LIQUID ORAL at 18:22

## 2018-04-18 RX ADMIN — DOCUSATE SODIUM 100 MG: 100 CAPSULE, LIQUID FILLED ORAL at 10:47

## 2018-04-18 RX ADMIN — HYDROMORPHONE HYDROCHLORIDE 0.5 MG: 2 INJECTION, SOLUTION INTRAMUSCULAR; INTRAVENOUS; SUBCUTANEOUS at 08:05

## 2018-04-18 RX ADMIN — ONDANSETRON 4 MG: 2 INJECTION INTRAMUSCULAR; INTRAVENOUS at 08:40

## 2018-04-18 RX ADMIN — HYDROMORPHONE HYDROCHLORIDE 0.25 MG: 2 INJECTION INTRAMUSCULAR; INTRAVENOUS; SUBCUTANEOUS at 01:00

## 2018-04-18 RX ADMIN — LATANOPROST 1 DROP: 50 SOLUTION OPHTHALMIC at 21:02

## 2018-04-18 RX ADMIN — ONDANSETRON 4 MG: 2 INJECTION INTRAMUSCULAR; INTRAVENOUS at 03:08

## 2018-04-18 RX ADMIN — SENNOSIDES 8.6 MG: 8.6 TABLET ORAL at 21:00

## 2018-04-18 RX ADMIN — DOXEPIN HYDROCHLORIDE 50 MG: 50 CAPSULE ORAL at 16:59

## 2018-04-18 RX ADMIN — OXYCODONE HYDROCHLORIDE AND ACETAMINOPHEN 2 TABLET: 5; 325 TABLET ORAL at 21:00

## 2018-04-18 RX ADMIN — TIMOLOL MALEATE 1 DROP: 5 SOLUTION OPHTHALMIC at 08:42

## 2018-04-18 RX ADMIN — Medication 10 ML: at 07:39

## 2018-04-18 RX ADMIN — OXYCODONE HYDROCHLORIDE AND ACETAMINOPHEN 2 TABLET: 5; 325 TABLET ORAL at 15:06

## 2018-04-18 RX ADMIN — CEFAZOLIN SODIUM 2 G: 2 SOLUTION INTRAVENOUS at 07:39

## 2018-04-18 RX ADMIN — BRIMONIDINE TARTRATE 1 DROP: 2 SOLUTION OPHTHALMIC at 21:02

## 2018-04-18 RX ADMIN — BRIMONIDINE TARTRATE 1 DROP: 2 SOLUTION OPHTHALMIC at 08:41

## 2018-04-18 RX ADMIN — DOCUSATE SODIUM 100 MG: 100 CAPSULE, LIQUID FILLED ORAL at 21:02

## 2018-04-18 RX ADMIN — DOXEPIN HYDROCHLORIDE 50 MG: 50 CAPSULE ORAL at 21:00

## 2018-04-18 RX ADMIN — SODIUM CHLORIDE: 9 INJECTION, SOLUTION INTRAVENOUS at 02:46

## 2018-04-18 ASSESSMENT — PAIN DESCRIPTION - PAIN TYPE
TYPE: SURGICAL PAIN

## 2018-04-18 ASSESSMENT — PAIN SCALES - GENERAL
PAINLEVEL_OUTOF10: 4
PAINLEVEL_OUTOF10: 7
PAINLEVEL_OUTOF10: 7
PAINLEVEL_OUTOF10: 3
PAINLEVEL_OUTOF10: 7
PAINLEVEL_OUTOF10: 6
PAINLEVEL_OUTOF10: 7
PAINLEVEL_OUTOF10: 6
PAINLEVEL_OUTOF10: 5
PAINLEVEL_OUTOF10: 5
PAINLEVEL_OUTOF10: 3
PAINLEVEL_OUTOF10: 7
PAINLEVEL_OUTOF10: 6

## 2018-04-18 ASSESSMENT — PAIN DESCRIPTION - FREQUENCY
FREQUENCY: CONTINUOUS
FREQUENCY: CONTINUOUS

## 2018-04-18 ASSESSMENT — PAIN DESCRIPTION - ORIENTATION
ORIENTATION: MID
ORIENTATION: MID;LOWER
ORIENTATION: MID
ORIENTATION: MID;UPPER

## 2018-04-18 ASSESSMENT — PAIN DESCRIPTION - LOCATION
LOCATION: BACK

## 2018-04-18 ASSESSMENT — PAIN DESCRIPTION - DESCRIPTORS
DESCRIPTORS: ACHING;BURNING;DISCOMFORT;SORE
DESCRIPTORS: ACHING
DESCRIPTORS: ACHING
DESCRIPTORS: ACHING;BURNING
DESCRIPTORS: ACHING
DESCRIPTORS: ACHING
DESCRIPTORS: DULL;ACHING
DESCRIPTORS: ACHING;SORE;DISCOMFORT

## 2018-04-18 ASSESSMENT — PAIN DESCRIPTION - ONSET: ONSET: AWAKENED FROM SLEEP

## 2018-04-19 ENCOUNTER — APPOINTMENT (OUTPATIENT)
Dept: GENERAL RADIOLOGY | Age: 78
DRG: 453 | End: 2018-04-19
Attending: NEUROLOGICAL SURGERY
Payer: MEDICARE

## 2018-04-19 PROBLEM — K56.7 ILEUS (HCC): Status: ACTIVE | Noted: 2018-04-19

## 2018-04-19 LAB
BASOPHILS ABSOLUTE: 0.04 E9/L (ref 0–0.2)
BASOPHILS RELATIVE PERCENT: 0.3 % (ref 0–2)
EOSINOPHILS ABSOLUTE: 0.16 E9/L (ref 0.05–0.5)
EOSINOPHILS RELATIVE PERCENT: 1.1 % (ref 0–6)
HCT VFR BLD CALC: 21.6 % (ref 34–48)
HEMOGLOBIN: 7 G/DL (ref 11.5–15.5)
HYPOCHROMIA: ABNORMAL
IMMATURE GRANULOCYTES #: 0.11 E9/L
IMMATURE GRANULOCYTES %: 0.7 % (ref 0–5)
LYMPHOCYTES ABSOLUTE: 1.67 E9/L (ref 1.5–4)
LYMPHOCYTES RELATIVE PERCENT: 11.1 % (ref 20–42)
MCH RBC QN AUTO: 29.4 PG (ref 26–35)
MCHC RBC AUTO-ENTMCNC: 32.4 % (ref 32–34.5)
MCV RBC AUTO: 90.8 FL (ref 80–99.9)
MONOCYTES ABSOLUTE: 1.67 E9/L (ref 0.1–0.95)
MONOCYTES RELATIVE PERCENT: 11.1 % (ref 2–12)
NEUTROPHILS ABSOLUTE: 11.45 E9/L (ref 1.8–7.3)
NEUTROPHILS RELATIVE PERCENT: 75.7 % (ref 43–80)
OVALOCYTES: ABNORMAL
PDW BLD-RTO: 15.7 FL (ref 11.5–15)
PLATELET # BLD: 193 E9/L (ref 130–450)
PMV BLD AUTO: 9.4 FL (ref 7–12)
POIKILOCYTES: ABNORMAL
POLYCHROMASIA: ABNORMAL
RBC # BLD: 2.38 E12/L (ref 3.5–5.5)
WBC # BLD: 15.1 E9/L (ref 4.5–11.5)

## 2018-04-19 PROCEDURE — 36415 COLL VENOUS BLD VENIPUNCTURE: CPT

## 2018-04-19 PROCEDURE — 86901 BLOOD TYPING SEROLOGIC RH(D): CPT

## 2018-04-19 PROCEDURE — 6370000000 HC RX 637 (ALT 250 FOR IP): Performed by: NEUROLOGICAL SURGERY

## 2018-04-19 PROCEDURE — 6360000002 HC RX W HCPCS: Performed by: PHYSICIAN ASSISTANT

## 2018-04-19 PROCEDURE — 99232 SBSQ HOSP IP/OBS MODERATE 35: CPT | Performed by: SURGERY

## 2018-04-19 PROCEDURE — 97530 THERAPEUTIC ACTIVITIES: CPT

## 2018-04-19 PROCEDURE — 1200000000 HC SEMI PRIVATE

## 2018-04-19 PROCEDURE — 72082 X-RAY EXAM ENTIRE SPI 2/3 VW: CPT

## 2018-04-19 PROCEDURE — 97535 SELF CARE MNGMENT TRAINING: CPT

## 2018-04-19 PROCEDURE — 86850 RBC ANTIBODY SCREEN: CPT

## 2018-04-19 PROCEDURE — 2580000003 HC RX 258: Performed by: PHYSICIAN ASSISTANT

## 2018-04-19 PROCEDURE — 6370000000 HC RX 637 (ALT 250 FOR IP): Performed by: PHYSICIAN ASSISTANT

## 2018-04-19 PROCEDURE — 6370000000 HC RX 637 (ALT 250 FOR IP): Performed by: STUDENT IN AN ORGANIZED HEALTH CARE EDUCATION/TRAINING PROGRAM

## 2018-04-19 PROCEDURE — 85025 COMPLETE CBC W/AUTO DIFF WBC: CPT

## 2018-04-19 PROCEDURE — 6370000000 HC RX 637 (ALT 250 FOR IP): Performed by: SURGERY

## 2018-04-19 PROCEDURE — 86900 BLOOD TYPING SEROLOGIC ABO: CPT

## 2018-04-19 RX ORDER — SENNA AND DOCUSATE SODIUM 50; 8.6 MG/1; MG/1
2 TABLET, FILM COATED ORAL DAILY PRN
Status: DISCONTINUED | OUTPATIENT
Start: 2018-04-19 | End: 2018-04-19

## 2018-04-19 RX ORDER — SENNA AND DOCUSATE SODIUM 50; 8.6 MG/1; MG/1
2 TABLET, FILM COATED ORAL DAILY
Status: DISCONTINUED | OUTPATIENT
Start: 2018-04-19 | End: 2018-04-30 | Stop reason: HOSPADM

## 2018-04-19 RX ORDER — POLYETHYLENE GLYCOL 3350 17 G/17G
17 POWDER, FOR SOLUTION ORAL 2 TIMES DAILY
Status: DISCONTINUED | OUTPATIENT
Start: 2018-04-19 | End: 2018-04-30 | Stop reason: HOSPADM

## 2018-04-19 RX ORDER — 0.9 % SODIUM CHLORIDE 0.9 %
250 INTRAVENOUS SOLUTION INTRAVENOUS ONCE
Status: DISCONTINUED | OUTPATIENT
Start: 2018-04-19 | End: 2018-04-21

## 2018-04-19 RX ADMIN — Medication 10 ML: at 12:00

## 2018-04-19 RX ADMIN — TIMOLOL MALEATE 1 DROP: 5 SOLUTION OPHTHALMIC at 10:05

## 2018-04-19 RX ADMIN — POLYETHYLENE GLYCOL 3350 17 G: 17 POWDER, FOR SOLUTION ORAL at 10:08

## 2018-04-19 RX ADMIN — POLYETHYLENE GLYCOL 3350 17 G: 17 POWDER, FOR SOLUTION ORAL at 20:47

## 2018-04-19 RX ADMIN — DOXEPIN HYDROCHLORIDE 50 MG: 50 CAPSULE ORAL at 20:13

## 2018-04-19 RX ADMIN — SENNOSIDES 8.6 MG: 8.6 TABLET ORAL at 20:13

## 2018-04-19 RX ADMIN — Medication 10 ML: at 10:09

## 2018-04-19 RX ADMIN — HYDROMORPHONE HYDROCHLORIDE 0.5 MG: 2 INJECTION, SOLUTION INTRAMUSCULAR; INTRAVENOUS; SUBCUTANEOUS at 08:45

## 2018-04-19 RX ADMIN — BISACODYL 10 MG: 10 SUPPOSITORY RECTAL at 20:13

## 2018-04-19 RX ADMIN — OXYCODONE HYDROCHLORIDE AND ACETAMINOPHEN 2 TABLET: 5; 325 TABLET ORAL at 06:34

## 2018-04-19 RX ADMIN — DOXEPIN HYDROCHLORIDE 50 MG: 50 CAPSULE ORAL at 10:06

## 2018-04-19 RX ADMIN — BRIMONIDINE TARTRATE 1 DROP: 2 SOLUTION OPHTHALMIC at 10:04

## 2018-04-19 RX ADMIN — Medication 1 ENEMA: at 11:35

## 2018-04-19 RX ADMIN — TIMOLOL MALEATE 1 DROP: 5 SOLUTION OPHTHALMIC at 20:12

## 2018-04-19 RX ADMIN — Medication 10 ML: at 20:13

## 2018-04-19 RX ADMIN — DOXEPIN HYDROCHLORIDE 50 MG: 50 CAPSULE ORAL at 12:49

## 2018-04-19 RX ADMIN — STANDARDIZED SENNA CONCENTRATE AND DOCUSATE SODIUM 2 TABLET: 8.6; 5 TABLET, FILM COATED ORAL at 12:21

## 2018-04-19 RX ADMIN — MAGNESIUM HYDROXIDE 30 ML: 400 SUSPENSION ORAL at 20:47

## 2018-04-19 RX ADMIN — BRIMONIDINE TARTRATE 1 DROP: 2 SOLUTION OPHTHALMIC at 20:13

## 2018-04-19 RX ADMIN — LEVOTHYROXINE SODIUM 50 MCG: 50 TABLET ORAL at 06:34

## 2018-04-19 RX ADMIN — DOXEPIN HYDROCHLORIDE 50 MG: 50 CAPSULE ORAL at 16:18

## 2018-04-19 RX ADMIN — LATANOPROST 1 DROP: 50 SOLUTION OPHTHALMIC at 20:13

## 2018-04-19 RX ADMIN — CETIRIZINE HYDROCHLORIDE 5 MG: 10 TABLET, FILM COATED ORAL at 10:05

## 2018-04-19 ASSESSMENT — PAIN DESCRIPTION - DESCRIPTORS
DESCRIPTORS: ACHING
DESCRIPTORS: ACHING;CONSTANT;DISCOMFORT

## 2018-04-19 ASSESSMENT — PAIN DESCRIPTION - ORIENTATION
ORIENTATION: LOWER
ORIENTATION: LOWER

## 2018-04-19 ASSESSMENT — PAIN SCALES - GENERAL
PAINLEVEL_OUTOF10: 7
PAINLEVEL_OUTOF10: 0
PAINLEVEL_OUTOF10: 7
PAINLEVEL_OUTOF10: 7

## 2018-04-19 ASSESSMENT — PAIN DESCRIPTION - PAIN TYPE: TYPE: ACUTE PAIN

## 2018-04-19 ASSESSMENT — PAIN DESCRIPTION - LOCATION
LOCATION: BACK
LOCATION: BACK

## 2018-04-19 ASSESSMENT — PAIN DESCRIPTION - FREQUENCY
FREQUENCY: CONTINUOUS
FREQUENCY: CONTINUOUS

## 2018-04-20 LAB
ABO/RH: NORMAL
ANION GAP SERPL CALCULATED.3IONS-SCNC: 12 MMOL/L (ref 7–16)
ANTIBODY SCREEN: NORMAL
BLOOD BANK DISPENSE STATUS: NORMAL
BLOOD BANK DISPENSE STATUS: NORMAL
BLOOD BANK PRODUCT CODE: NORMAL
BLOOD BANK PRODUCT CODE: NORMAL
BPU ID: NORMAL
BPU ID: NORMAL
BUN BLDV-MCNC: 8 MG/DL (ref 8–23)
CALCIUM SERPL-MCNC: 8.5 MG/DL (ref 8.6–10.2)
CHLORIDE BLD-SCNC: 98 MMOL/L (ref 98–107)
CO2: 26 MMOL/L (ref 22–29)
CREAT SERPL-MCNC: 0.6 MG/DL (ref 0.5–1)
DESCRIPTION BLOOD BANK: NORMAL
DESCRIPTION BLOOD BANK: NORMAL
GFR AFRICAN AMERICAN: >60
GFR NON-AFRICAN AMERICAN: >60 ML/MIN/1.73
GLUCOSE BLD-MCNC: 124 MG/DL (ref 74–109)
HCT VFR BLD CALC: 27.6 % (ref 34–48)
HEMOGLOBIN: 9.1 G/DL (ref 11.5–15.5)
MCH RBC QN AUTO: 28.6 PG (ref 26–35)
MCHC RBC AUTO-ENTMCNC: 33 % (ref 32–34.5)
MCV RBC AUTO: 86.8 FL (ref 80–99.9)
PDW BLD-RTO: 15.2 FL (ref 11.5–15)
PLATELET # BLD: 169 E9/L (ref 130–450)
PMV BLD AUTO: 10 FL (ref 7–12)
POTASSIUM SERPL-SCNC: 3.5 MMOL/L (ref 3.5–5)
RBC # BLD: 3.18 E12/L (ref 3.5–5.5)
SODIUM BLD-SCNC: 136 MMOL/L (ref 132–146)
WBC # BLD: 14.5 E9/L (ref 4.5–11.5)

## 2018-04-20 PROCEDURE — 6370000000 HC RX 637 (ALT 250 FOR IP): Performed by: STUDENT IN AN ORGANIZED HEALTH CARE EDUCATION/TRAINING PROGRAM

## 2018-04-20 PROCEDURE — 36430 TRANSFUSION BLD/BLD COMPNT: CPT

## 2018-04-20 PROCEDURE — 80048 BASIC METABOLIC PNL TOTAL CA: CPT

## 2018-04-20 PROCEDURE — 6370000000 HC RX 637 (ALT 250 FOR IP): Performed by: NEUROLOGICAL SURGERY

## 2018-04-20 PROCEDURE — 6370000000 HC RX 637 (ALT 250 FOR IP): Performed by: PHYSICIAN ASSISTANT

## 2018-04-20 PROCEDURE — P9016 RBC LEUKOCYTES REDUCED: HCPCS

## 2018-04-20 PROCEDURE — 2580000003 HC RX 258: Performed by: PHYSICIAN ASSISTANT

## 2018-04-20 PROCEDURE — 1200000000 HC SEMI PRIVATE

## 2018-04-20 PROCEDURE — 99232 SBSQ HOSP IP/OBS MODERATE 35: CPT | Performed by: SURGERY

## 2018-04-20 PROCEDURE — 6370000000 HC RX 637 (ALT 250 FOR IP): Performed by: SURGERY

## 2018-04-20 PROCEDURE — 36415 COLL VENOUS BLD VENIPUNCTURE: CPT

## 2018-04-20 PROCEDURE — 86923 COMPATIBILITY TEST ELECTRIC: CPT

## 2018-04-20 PROCEDURE — 6360000002 HC RX W HCPCS: Performed by: PHYSICIAN ASSISTANT

## 2018-04-20 PROCEDURE — 85027 COMPLETE CBC AUTOMATED: CPT

## 2018-04-20 RX ORDER — OXYCODONE HYDROCHLORIDE AND ACETAMINOPHEN 5; 325 MG/1; MG/1
2 TABLET ORAL EVERY 4 HOURS PRN
Qty: 120 TABLET | Refills: 0 | Status: SHIPPED | OUTPATIENT
Start: 2018-04-20 | End: 2019-04-20

## 2018-04-20 RX ORDER — DIAZEPAM 5 MG/1
5 TABLET ORAL EVERY 8 HOURS PRN
Qty: 90 TABLET | Refills: 0 | Status: SHIPPED | OUTPATIENT
Start: 2018-04-20 | End: 2019-04-20

## 2018-04-20 RX ADMIN — BRIMONIDINE TARTRATE 1 DROP: 2 SOLUTION OPHTHALMIC at 10:14

## 2018-04-20 RX ADMIN — OXYCODONE HYDROCHLORIDE AND ACETAMINOPHEN 2 TABLET: 5; 325 TABLET ORAL at 22:49

## 2018-04-20 RX ADMIN — ACETAMINOPHEN 650 MG: 325 TABLET, FILM COATED ORAL at 01:06

## 2018-04-20 RX ADMIN — BISACODYL 10 MG: 10 SUPPOSITORY RECTAL at 11:49

## 2018-04-20 RX ADMIN — STANDARDIZED SENNA CONCENTRATE AND DOCUSATE SODIUM 2 TABLET: 8.6; 5 TABLET, FILM COATED ORAL at 10:13

## 2018-04-20 RX ADMIN — HYDROMORPHONE HYDROCHLORIDE 0.5 MG: 2 INJECTION, SOLUTION INTRAMUSCULAR; INTRAVENOUS; SUBCUTANEOUS at 14:57

## 2018-04-20 RX ADMIN — SENNOSIDES 8.6 MG: 8.6 TABLET ORAL at 22:50

## 2018-04-20 RX ADMIN — DIAZEPAM 5 MG: 5 TABLET ORAL at 15:03

## 2018-04-20 RX ADMIN — DOXEPIN HYDROCHLORIDE 50 MG: 50 CAPSULE ORAL at 17:40

## 2018-04-20 RX ADMIN — MAGNESIUM HYDROXIDE 30 ML: 400 SUSPENSION ORAL at 22:49

## 2018-04-20 RX ADMIN — ACETAMINOPHEN 650 MG: 325 TABLET, FILM COATED ORAL at 08:35

## 2018-04-20 RX ADMIN — POLYETHYLENE GLYCOL 3350 17 G: 17 POWDER, FOR SOLUTION ORAL at 22:49

## 2018-04-20 RX ADMIN — Medication 10 ML: at 01:06

## 2018-04-20 RX ADMIN — LEVOTHYROXINE SODIUM 50 MCG: 50 TABLET ORAL at 06:35

## 2018-04-20 RX ADMIN — DOXEPIN HYDROCHLORIDE 50 MG: 50 CAPSULE ORAL at 14:49

## 2018-04-20 RX ADMIN — MAGESIUM CITRATE 296 ML: 1.75 LIQUID ORAL at 06:35

## 2018-04-20 RX ADMIN — DOXEPIN HYDROCHLORIDE 50 MG: 50 CAPSULE ORAL at 10:13

## 2018-04-20 RX ADMIN — OXYCODONE HYDROCHLORIDE AND ACETAMINOPHEN 2 TABLET: 5; 325 TABLET ORAL at 15:02

## 2018-04-20 RX ADMIN — POLYETHYLENE GLYCOL 3350 17 G: 17 POWDER, FOR SOLUTION ORAL at 10:14

## 2018-04-20 RX ADMIN — TIMOLOL MALEATE 1 DROP: 5 SOLUTION OPHTHALMIC at 10:14

## 2018-04-20 RX ADMIN — BRIMONIDINE TARTRATE 1 DROP: 2 SOLUTION OPHTHALMIC at 22:51

## 2018-04-20 RX ADMIN — DIAZEPAM 5 MG: 5 TABLET ORAL at 08:35

## 2018-04-20 RX ADMIN — CETIRIZINE HYDROCHLORIDE 5 MG: 10 TABLET, FILM COATED ORAL at 10:12

## 2018-04-20 RX ADMIN — LATANOPROST 1 DROP: 50 SOLUTION OPHTHALMIC at 22:51

## 2018-04-20 RX ADMIN — TIMOLOL MALEATE 1 DROP: 5 SOLUTION OPHTHALMIC at 22:51

## 2018-04-20 RX ADMIN — HYDROMORPHONE HYDROCHLORIDE 0.5 MG: 2 INJECTION, SOLUTION INTRAMUSCULAR; INTRAVENOUS; SUBCUTANEOUS at 06:42

## 2018-04-20 RX ADMIN — DOXEPIN HYDROCHLORIDE 50 MG: 50 CAPSULE ORAL at 22:50

## 2018-04-20 ASSESSMENT — PAIN SCALES - GENERAL
PAINLEVEL_OUTOF10: 6
PAINLEVEL_OUTOF10: 5
PAINLEVEL_OUTOF10: 0
PAINLEVEL_OUTOF10: 0
PAINLEVEL_OUTOF10: 7
PAINLEVEL_OUTOF10: 7
PAINLEVEL_OUTOF10: 4
PAINLEVEL_OUTOF10: 6
PAINLEVEL_OUTOF10: 7
PAINLEVEL_OUTOF10: 0

## 2018-04-20 ASSESSMENT — PAIN DESCRIPTION - FREQUENCY
FREQUENCY: CONTINUOUS
FREQUENCY: CONTINUOUS

## 2018-04-20 ASSESSMENT — PAIN DESCRIPTION - ORIENTATION
ORIENTATION: LOWER
ORIENTATION: LOWER

## 2018-04-20 ASSESSMENT — PAIN DESCRIPTION - LOCATION
LOCATION: BACK
LOCATION: BACK
LOCATION: BACK;OTHER (COMMENT)

## 2018-04-20 ASSESSMENT — PAIN DESCRIPTION - ONSET: ONSET: ON-GOING

## 2018-04-20 ASSESSMENT — PAIN DESCRIPTION - PAIN TYPE
TYPE: SURGICAL PAIN
TYPE: SURGICAL PAIN

## 2018-04-20 ASSESSMENT — PAIN DESCRIPTION - DESCRIPTORS
DESCRIPTORS: ACHING
DESCRIPTORS: ACHING

## 2018-04-21 ENCOUNTER — APPOINTMENT (OUTPATIENT)
Dept: ULTRASOUND IMAGING | Age: 78
DRG: 453 | End: 2018-04-21
Attending: NEUROLOGICAL SURGERY
Payer: MEDICARE

## 2018-04-21 ENCOUNTER — APPOINTMENT (OUTPATIENT)
Dept: GENERAL RADIOLOGY | Age: 78
DRG: 453 | End: 2018-04-21
Attending: NEUROLOGICAL SURGERY
Payer: MEDICARE

## 2018-04-21 PROCEDURE — 93970 EXTREMITY STUDY: CPT

## 2018-04-21 PROCEDURE — 93971 EXTREMITY STUDY: CPT

## 2018-04-21 PROCEDURE — 87040 BLOOD CULTURE FOR BACTERIA: CPT

## 2018-04-21 PROCEDURE — 2060000000 HC ICU INTERMEDIATE R&B

## 2018-04-21 PROCEDURE — 6370000000 HC RX 637 (ALT 250 FOR IP): Performed by: SURGERY

## 2018-04-21 PROCEDURE — 6360000002 HC RX W HCPCS: Performed by: FAMILY MEDICINE

## 2018-04-21 PROCEDURE — 2580000003 HC RX 258: Performed by: FAMILY MEDICINE

## 2018-04-21 PROCEDURE — 36415 COLL VENOUS BLD VENIPUNCTURE: CPT

## 2018-04-21 PROCEDURE — 6370000000 HC RX 637 (ALT 250 FOR IP): Performed by: PHYSICIAN ASSISTANT

## 2018-04-21 PROCEDURE — 2580000003 HC RX 258: Performed by: PHYSICIAN ASSISTANT

## 2018-04-21 PROCEDURE — 6370000000 HC RX 637 (ALT 250 FOR IP): Performed by: STUDENT IN AN ORGANIZED HEALTH CARE EDUCATION/TRAINING PROGRAM

## 2018-04-21 PROCEDURE — 6370000000 HC RX 637 (ALT 250 FOR IP): Performed by: NEUROLOGICAL SURGERY

## 2018-04-21 PROCEDURE — 71045 X-RAY EXAM CHEST 1 VIEW: CPT

## 2018-04-21 PROCEDURE — 99232 SBSQ HOSP IP/OBS MODERATE 35: CPT | Performed by: SURGERY

## 2018-04-21 PROCEDURE — 2700000000 HC OXYGEN THERAPY PER DAY

## 2018-04-21 PROCEDURE — 6370000000 HC RX 637 (ALT 250 FOR IP): Performed by: FAMILY MEDICINE

## 2018-04-21 RX ORDER — ACETAMINOPHEN 500 MG
500 TABLET ORAL EVERY 6 HOURS PRN
Status: DISCONTINUED | OUTPATIENT
Start: 2018-04-21 | End: 2018-04-30 | Stop reason: HOSPADM

## 2018-04-21 RX ORDER — SODIUM CHLORIDE 9 MG/ML
INJECTION, SOLUTION INTRAVENOUS CONTINUOUS
Status: ACTIVE | OUTPATIENT
Start: 2018-04-21 | End: 2018-04-22

## 2018-04-21 RX ORDER — PIPERACILLIN SODIUM, TAZOBACTAM SODIUM 3; .375 G/15ML; G/15ML
3.38 INJECTION, POWDER, LYOPHILIZED, FOR SOLUTION INTRAVENOUS EVERY 8 HOURS
Status: DISCONTINUED | OUTPATIENT
Start: 2018-04-21 | End: 2018-04-21 | Stop reason: CLARIF

## 2018-04-21 RX ADMIN — BRIMONIDINE TARTRATE 1 DROP: 2 SOLUTION OPHTHALMIC at 08:51

## 2018-04-21 RX ADMIN — POLYETHYLENE GLYCOL 3350 17 G: 17 POWDER, FOR SOLUTION ORAL at 21:15

## 2018-04-21 RX ADMIN — LEVOTHYROXINE SODIUM 50 MCG: 50 TABLET ORAL at 06:21

## 2018-04-21 RX ADMIN — Medication 10 ML: at 09:00

## 2018-04-21 RX ADMIN — PIPERACILLIN SODIUM AND TAZOBACTAM SODIUM 3.38 G: 3; .375 INJECTION, POWDER, LYOPHILIZED, FOR SOLUTION INTRAVENOUS at 21:07

## 2018-04-21 RX ADMIN — Medication 10 ML: at 21:10

## 2018-04-21 RX ADMIN — DOXEPIN HYDROCHLORIDE 50 MG: 50 CAPSULE ORAL at 13:30

## 2018-04-21 RX ADMIN — DOXEPIN HYDROCHLORIDE 50 MG: 50 CAPSULE ORAL at 17:40

## 2018-04-21 RX ADMIN — DOXEPIN HYDROCHLORIDE 50 MG: 50 CAPSULE ORAL at 21:09

## 2018-04-21 RX ADMIN — TIMOLOL MALEATE 1 DROP: 5 SOLUTION OPHTHALMIC at 21:09

## 2018-04-21 RX ADMIN — TIMOLOL MALEATE 1 DROP: 5 SOLUTION OPHTHALMIC at 08:51

## 2018-04-21 RX ADMIN — SENNOSIDES 8.6 MG: 8.6 TABLET ORAL at 21:09

## 2018-04-21 RX ADMIN — SODIUM CHLORIDE: 9 INJECTION, SOLUTION INTRAVENOUS at 05:29

## 2018-04-21 RX ADMIN — DOXEPIN HYDROCHLORIDE 50 MG: 50 CAPSULE ORAL at 08:50

## 2018-04-21 RX ADMIN — ACETAMINOPHEN 500 MG: 500 TABLET, COATED ORAL at 21:15

## 2018-04-21 RX ADMIN — CETIRIZINE HYDROCHLORIDE 5 MG: 10 TABLET, FILM COATED ORAL at 05:00

## 2018-04-21 RX ADMIN — ACETAMINOPHEN 650 MG: 325 TABLET, FILM COATED ORAL at 10:44

## 2018-04-21 RX ADMIN — BRIMONIDINE TARTRATE 1 DROP: 2 SOLUTION OPHTHALMIC at 21:09

## 2018-04-21 RX ADMIN — LATANOPROST 1 DROP: 50 SOLUTION OPHTHALMIC at 21:09

## 2018-04-21 RX ADMIN — MAGNESIUM HYDROXIDE 30 ML: 400 SUSPENSION ORAL at 21:09

## 2018-04-21 RX ADMIN — SODIUM CHLORIDE: 9 INJECTION, SOLUTION INTRAVENOUS at 17:45

## 2018-04-21 RX ADMIN — BISACODYL 10 MG: 10 SUPPOSITORY RECTAL at 21:09

## 2018-04-21 ASSESSMENT — PAIN DESCRIPTION - ORIENTATION
ORIENTATION: RIGHT;LEFT
ORIENTATION: RIGHT;LEFT
ORIENTATION: RIGHT;LEFT;LOWER;MID;UPPER

## 2018-04-21 ASSESSMENT — PAIN DESCRIPTION - PAIN TYPE
TYPE: SURGICAL PAIN

## 2018-04-21 ASSESSMENT — PAIN SCALES - GENERAL
PAINLEVEL_OUTOF10: 0
PAINLEVEL_OUTOF10: 4
PAINLEVEL_OUTOF10: 10

## 2018-04-21 ASSESSMENT — PAIN DESCRIPTION - FREQUENCY
FREQUENCY: CONTINUOUS

## 2018-04-21 ASSESSMENT — PAIN DESCRIPTION - LOCATION
LOCATION: ABDOMEN;BACK
LOCATION: ABDOMEN;BACK
LOCATION: ABDOMEN

## 2018-04-21 ASSESSMENT — PAIN DESCRIPTION - PROGRESSION: CLINICAL_PROGRESSION: NOT CHANGED

## 2018-04-21 ASSESSMENT — PAIN DESCRIPTION - ONSET
ONSET: ON-GOING

## 2018-04-21 ASSESSMENT — PAIN DESCRIPTION - DESCRIPTORS
DESCRIPTORS: CRAMPING;DULL;SORE
DESCRIPTORS: CRAMPING
DESCRIPTORS: CRAMPING

## 2018-04-22 LAB
ANION GAP SERPL CALCULATED.3IONS-SCNC: 15 MMOL/L (ref 7–16)
BACTERIA: ABNORMAL /HPF
BASOPHILS ABSOLUTE: 0 E9/L (ref 0–0.2)
BASOPHILS RELATIVE PERCENT: 0.2 % (ref 0–2)
BILIRUBIN URINE: NEGATIVE
BLOOD, URINE: NEGATIVE
BUN BLDV-MCNC: 10 MG/DL (ref 8–23)
CALCIUM SERPL-MCNC: 7.9 MG/DL (ref 8.6–10.2)
CHLORIDE BLD-SCNC: 98 MMOL/L (ref 98–107)
CLARITY: CLEAR
CO2: 25 MMOL/L (ref 22–29)
COLOR: YELLOW
CREAT SERPL-MCNC: 0.6 MG/DL (ref 0.5–1)
EKG ATRIAL RATE: 97 BPM
EKG P AXIS: 55 DEGREES
EKG P-R INTERVAL: 182 MS
EKG Q-T INTERVAL: 370 MS
EKG QRS DURATION: 84 MS
EKG QTC CALCULATION (BAZETT): 469 MS
EKG R AXIS: -18 DEGREES
EKG T AXIS: 43 DEGREES
EKG VENTRICULAR RATE: 97 BPM
EOSINOPHILS ABSOLUTE: 0.66 E9/L (ref 0.05–0.5)
EOSINOPHILS RELATIVE PERCENT: 4.5 % (ref 0–6)
GFR AFRICAN AMERICAN: >60
GFR NON-AFRICAN AMERICAN: >60 ML/MIN/1.73
GLUCOSE BLD-MCNC: 113 MG/DL (ref 74–109)
GLUCOSE URINE: NEGATIVE MG/DL
HCT VFR BLD CALC: 25.7 % (ref 34–48)
HEMOGLOBIN: 8.4 G/DL (ref 11.5–15.5)
HYPOCHROMIA: ABNORMAL
KETONES, URINE: 40 MG/DL
LEUKOCYTE ESTERASE, URINE: NEGATIVE
LYMPHOCYTES ABSOLUTE: 1.46 E9/L (ref 1.5–4)
LYMPHOCYTES RELATIVE PERCENT: 9.9 % (ref 20–42)
MAGNESIUM: 2.6 MG/DL (ref 1.6–2.6)
MCH RBC QN AUTO: 29.1 PG (ref 26–35)
MCHC RBC AUTO-ENTMCNC: 32.7 % (ref 32–34.5)
MCV RBC AUTO: 88.9 FL (ref 80–99.9)
MONOCYTES ABSOLUTE: 0.44 E9/L (ref 0.1–0.95)
MONOCYTES RELATIVE PERCENT: 2.7 % (ref 2–12)
NEUTROPHILS ABSOLUTE: 12.12 E9/L (ref 1.8–7.3)
NEUTROPHILS RELATIVE PERCENT: 82.9 % (ref 43–80)
NITRITE, URINE: NEGATIVE
PDW BLD-RTO: 14.8 FL (ref 11.5–15)
PH UA: 6 (ref 5–9)
PLATELET # BLD: 303 E9/L (ref 130–450)
PMV BLD AUTO: 8.9 FL (ref 7–12)
POTASSIUM REFLEX MAGNESIUM: 3.4 MMOL/L (ref 3.5–5)
PROTEIN UA: 30 MG/DL
RBC # BLD: 2.89 E12/L (ref 3.5–5.5)
RBC UA: ABNORMAL /HPF (ref 0–2)
SODIUM BLD-SCNC: 138 MMOL/L (ref 132–146)
SPECIFIC GRAVITY UA: 1.02 (ref 1–1.03)
UROBILINOGEN, URINE: 0.2 E.U./DL
WBC # BLD: 14.6 E9/L (ref 4.5–11.5)
WBC UA: ABNORMAL /HPF (ref 0–5)

## 2018-04-22 PROCEDURE — 6370000000 HC RX 637 (ALT 250 FOR IP): Performed by: NEUROLOGICAL SURGERY

## 2018-04-22 PROCEDURE — 85025 COMPLETE CBC W/AUTO DIFF WBC: CPT

## 2018-04-22 PROCEDURE — 93005 ELECTROCARDIOGRAM TRACING: CPT | Performed by: FAMILY MEDICINE

## 2018-04-22 PROCEDURE — 2580000003 HC RX 258: Performed by: FAMILY MEDICINE

## 2018-04-22 PROCEDURE — 81001 URINALYSIS AUTO W/SCOPE: CPT

## 2018-04-22 PROCEDURE — 6360000002 HC RX W HCPCS: Performed by: PHYSICIAN ASSISTANT

## 2018-04-22 PROCEDURE — 83735 ASSAY OF MAGNESIUM: CPT

## 2018-04-22 PROCEDURE — 6370000000 HC RX 637 (ALT 250 FOR IP): Performed by: STUDENT IN AN ORGANIZED HEALTH CARE EDUCATION/TRAINING PROGRAM

## 2018-04-22 PROCEDURE — 99232 SBSQ HOSP IP/OBS MODERATE 35: CPT | Performed by: SURGERY

## 2018-04-22 PROCEDURE — 97530 THERAPEUTIC ACTIVITIES: CPT

## 2018-04-22 PROCEDURE — 87088 URINE BACTERIA CULTURE: CPT

## 2018-04-22 PROCEDURE — 6360000002 HC RX W HCPCS: Performed by: FAMILY MEDICINE

## 2018-04-22 PROCEDURE — 2700000000 HC OXYGEN THERAPY PER DAY

## 2018-04-22 PROCEDURE — 51798 US URINE CAPACITY MEASURE: CPT

## 2018-04-22 PROCEDURE — 2060000000 HC ICU INTERMEDIATE R&B

## 2018-04-22 PROCEDURE — 51701 INSERT BLADDER CATHETER: CPT

## 2018-04-22 PROCEDURE — 80048 BASIC METABOLIC PNL TOTAL CA: CPT

## 2018-04-22 PROCEDURE — 2580000003 HC RX 258: Performed by: PHYSICIAN ASSISTANT

## 2018-04-22 PROCEDURE — 6370000000 HC RX 637 (ALT 250 FOR IP): Performed by: PHYSICIAN ASSISTANT

## 2018-04-22 PROCEDURE — 36415 COLL VENOUS BLD VENIPUNCTURE: CPT

## 2018-04-22 PROCEDURE — 6370000000 HC RX 637 (ALT 250 FOR IP): Performed by: SURGERY

## 2018-04-22 PROCEDURE — 6370000000 HC RX 637 (ALT 250 FOR IP): Performed by: FAMILY MEDICINE

## 2018-04-22 RX ADMIN — MAGNESIUM HYDROXIDE 30 ML: 400 SUSPENSION ORAL at 20:51

## 2018-04-22 RX ADMIN — PIPERACILLIN SODIUM AND TAZOBACTAM SODIUM 3.38 G: 3; .375 INJECTION, POWDER, LYOPHILIZED, FOR SOLUTION INTRAVENOUS at 14:39

## 2018-04-22 RX ADMIN — SODIUM CHLORIDE: 9 INJECTION, SOLUTION INTRAVENOUS at 10:25

## 2018-04-22 RX ADMIN — TIMOLOL MALEATE 1 DROP: 5 SOLUTION OPHTHALMIC at 20:52

## 2018-04-22 RX ADMIN — OXYCODONE HYDROCHLORIDE AND ACETAMINOPHEN 2 TABLET: 5; 325 TABLET ORAL at 14:38

## 2018-04-22 RX ADMIN — DOXEPIN HYDROCHLORIDE 50 MG: 50 CAPSULE ORAL at 14:38

## 2018-04-22 RX ADMIN — ENOXAPARIN SODIUM 40 MG: 40 INJECTION SUBCUTANEOUS at 11:57

## 2018-04-22 RX ADMIN — BRIMONIDINE TARTRATE 1 DROP: 2 SOLUTION OPHTHALMIC at 12:47

## 2018-04-22 RX ADMIN — BRIMONIDINE TARTRATE 1 DROP: 2 SOLUTION OPHTHALMIC at 20:52

## 2018-04-22 RX ADMIN — STANDARDIZED SENNA CONCENTRATE AND DOCUSATE SODIUM 2 TABLET: 8.6; 5 TABLET, FILM COATED ORAL at 10:28

## 2018-04-22 RX ADMIN — VANCOMYCIN HYDROCHLORIDE 1250 MG: 1 INJECTION, POWDER, LYOPHILIZED, FOR SOLUTION INTRAVENOUS at 00:44

## 2018-04-22 RX ADMIN — POLYETHYLENE GLYCOL 3350 17 G: 17 POWDER, FOR SOLUTION ORAL at 10:29

## 2018-04-22 RX ADMIN — LEVOTHYROXINE SODIUM 50 MCG: 50 TABLET ORAL at 06:51

## 2018-04-22 RX ADMIN — DOXEPIN HYDROCHLORIDE 50 MG: 50 CAPSULE ORAL at 10:28

## 2018-04-22 RX ADMIN — Medication 10 ML: at 20:52

## 2018-04-22 RX ADMIN — POLYETHYLENE GLYCOL 3350 17 G: 17 POWDER, FOR SOLUTION ORAL at 20:52

## 2018-04-22 RX ADMIN — OXYCODONE HYDROCHLORIDE AND ACETAMINOPHEN 2 TABLET: 5; 325 TABLET ORAL at 08:28

## 2018-04-22 RX ADMIN — TIMOLOL MALEATE 1 DROP: 5 SOLUTION OPHTHALMIC at 12:47

## 2018-04-22 RX ADMIN — BISACODYL 10 MG: 10 SUPPOSITORY RECTAL at 10:29

## 2018-04-22 RX ADMIN — DOXEPIN HYDROCHLORIDE 50 MG: 50 CAPSULE ORAL at 15:43

## 2018-04-22 RX ADMIN — DOXEPIN HYDROCHLORIDE 50 MG: 50 CAPSULE ORAL at 20:52

## 2018-04-22 RX ADMIN — SENNOSIDES 8.6 MG: 8.6 TABLET ORAL at 20:51

## 2018-04-22 RX ADMIN — BISACODYL 10 MG: 10 SUPPOSITORY RECTAL at 20:52

## 2018-04-22 RX ADMIN — PIPERACILLIN SODIUM AND TAZOBACTAM SODIUM 3.38 G: 3; .375 INJECTION, POWDER, LYOPHILIZED, FOR SOLUTION INTRAVENOUS at 22:42

## 2018-04-22 RX ADMIN — OXYCODONE HYDROCHLORIDE AND ACETAMINOPHEN 1 TABLET: 5; 325 TABLET ORAL at 22:42

## 2018-04-22 RX ADMIN — PIPERACILLIN SODIUM AND TAZOBACTAM SODIUM 3.38 G: 3; .375 INJECTION, POWDER, LYOPHILIZED, FOR SOLUTION INTRAVENOUS at 06:51

## 2018-04-22 RX ADMIN — ACETAMINOPHEN 500 MG: 500 TABLET, COATED ORAL at 03:45

## 2018-04-22 RX ADMIN — CETIRIZINE HYDROCHLORIDE 5 MG: 10 TABLET, FILM COATED ORAL at 10:27

## 2018-04-22 ASSESSMENT — PAIN SCALES - GENERAL
PAINLEVEL_OUTOF10: 2
PAINLEVEL_OUTOF10: 0
PAINLEVEL_OUTOF10: 7
PAINLEVEL_OUTOF10: 0
PAINLEVEL_OUTOF10: 4
PAINLEVEL_OUTOF10: 7
PAINLEVEL_OUTOF10: 2
PAINLEVEL_OUTOF10: 7

## 2018-04-22 ASSESSMENT — PAIN DESCRIPTION - LOCATION: LOCATION: ABDOMEN;BACK

## 2018-04-22 ASSESSMENT — PAIN DESCRIPTION - PROGRESSION: CLINICAL_PROGRESSION: NOT CHANGED

## 2018-04-22 ASSESSMENT — PAIN DESCRIPTION - PAIN TYPE: TYPE: SURGICAL PAIN

## 2018-04-22 ASSESSMENT — PAIN DESCRIPTION - ONSET: ONSET: ON-GOING

## 2018-04-22 ASSESSMENT — PAIN DESCRIPTION - DESCRIPTORS: DESCRIPTORS: ACHING;DISCOMFORT

## 2018-04-22 ASSESSMENT — PAIN DESCRIPTION - FREQUENCY: FREQUENCY: CONTINUOUS

## 2018-04-23 ENCOUNTER — APPOINTMENT (OUTPATIENT)
Dept: GENERAL RADIOLOGY | Age: 78
DRG: 453 | End: 2018-04-23
Attending: NEUROLOGICAL SURGERY
Payer: MEDICARE

## 2018-04-23 LAB
ALBUMIN SERPL-MCNC: 2.5 G/DL (ref 3.5–5.2)
ALP BLD-CCNC: 126 U/L (ref 35–104)
ALT SERPL-CCNC: 40 U/L (ref 0–32)
ANION GAP SERPL CALCULATED.3IONS-SCNC: 14 MMOL/L (ref 7–16)
AST SERPL-CCNC: 52 U/L (ref 0–31)
BILIRUB SERPL-MCNC: 0.4 MG/DL (ref 0–1.2)
BUN BLDV-MCNC: 8 MG/DL (ref 8–23)
CALCIUM SERPL-MCNC: 8 MG/DL (ref 8.6–10.2)
CHLORIDE BLD-SCNC: 94 MMOL/L (ref 98–107)
CO2: 25 MMOL/L (ref 22–29)
CREAT SERPL-MCNC: 0.6 MG/DL (ref 0.5–1)
GFR AFRICAN AMERICAN: >60
GFR NON-AFRICAN AMERICAN: >60 ML/MIN/1.73
GLUCOSE BLD-MCNC: 102 MG/DL (ref 74–109)
POTASSIUM SERPL-SCNC: 3.5 MMOL/L (ref 3.5–5)
SODIUM BLD-SCNC: 133 MMOL/L (ref 132–146)
TOTAL PROTEIN: 5.6 G/DL (ref 6.4–8.3)

## 2018-04-23 PROCEDURE — 74018 RADEX ABDOMEN 1 VIEW: CPT

## 2018-04-23 PROCEDURE — 6370000000 HC RX 637 (ALT 250 FOR IP): Performed by: STUDENT IN AN ORGANIZED HEALTH CARE EDUCATION/TRAINING PROGRAM

## 2018-04-23 PROCEDURE — 2580000003 HC RX 258: Performed by: FAMILY MEDICINE

## 2018-04-23 PROCEDURE — 6360000002 HC RX W HCPCS: Performed by: PHYSICIAN ASSISTANT

## 2018-04-23 PROCEDURE — 97530 THERAPEUTIC ACTIVITIES: CPT

## 2018-04-23 PROCEDURE — 51798 US URINE CAPACITY MEASURE: CPT

## 2018-04-23 PROCEDURE — 97535 SELF CARE MNGMENT TRAINING: CPT

## 2018-04-23 PROCEDURE — 2700000000 HC OXYGEN THERAPY PER DAY

## 2018-04-23 PROCEDURE — 2060000000 HC ICU INTERMEDIATE R&B

## 2018-04-23 PROCEDURE — 80053 COMPREHEN METABOLIC PANEL: CPT

## 2018-04-23 PROCEDURE — 36415 COLL VENOUS BLD VENIPUNCTURE: CPT

## 2018-04-23 PROCEDURE — 2580000003 HC RX 258: Performed by: PHYSICIAN ASSISTANT

## 2018-04-23 PROCEDURE — 6370000000 HC RX 637 (ALT 250 FOR IP): Performed by: PHYSICIAN ASSISTANT

## 2018-04-23 PROCEDURE — 51702 INSERT TEMP BLADDER CATH: CPT

## 2018-04-23 PROCEDURE — 99233 SBSQ HOSP IP/OBS HIGH 50: CPT | Performed by: SURGERY

## 2018-04-23 PROCEDURE — 93010 ELECTROCARDIOGRAM REPORT: CPT | Performed by: INTERNAL MEDICINE

## 2018-04-23 PROCEDURE — 6370000000 HC RX 637 (ALT 250 FOR IP): Performed by: SURGERY

## 2018-04-23 PROCEDURE — 6360000002 HC RX W HCPCS: Performed by: FAMILY MEDICINE

## 2018-04-23 RX ADMIN — HYDROMORPHONE HYDROCHLORIDE 0.25 MG: 2 INJECTION INTRAMUSCULAR; INTRAVENOUS; SUBCUTANEOUS at 02:33

## 2018-04-23 RX ADMIN — LEVOTHYROXINE SODIUM 50 MCG: 50 TABLET ORAL at 06:36

## 2018-04-23 RX ADMIN — TIMOLOL MALEATE 1 DROP: 5 SOLUTION OPHTHALMIC at 20:28

## 2018-04-23 RX ADMIN — DOXEPIN HYDROCHLORIDE 50 MG: 50 CAPSULE ORAL at 09:16

## 2018-04-23 RX ADMIN — OXYCODONE HYDROCHLORIDE AND ACETAMINOPHEN 1 TABLET: 5; 325 TABLET ORAL at 09:16

## 2018-04-23 RX ADMIN — DOXEPIN HYDROCHLORIDE 50 MG: 50 CAPSULE ORAL at 20:26

## 2018-04-23 RX ADMIN — BRIMONIDINE TARTRATE 1 DROP: 2 SOLUTION OPHTHALMIC at 20:28

## 2018-04-23 RX ADMIN — Medication 10 ML: at 09:17

## 2018-04-23 RX ADMIN — STANDARDIZED SENNA CONCENTRATE AND DOCUSATE SODIUM 2 TABLET: 8.6; 5 TABLET, FILM COATED ORAL at 09:16

## 2018-04-23 RX ADMIN — Medication 10 ML: at 23:34

## 2018-04-23 RX ADMIN — DOXEPIN HYDROCHLORIDE 50 MG: 50 CAPSULE ORAL at 16:33

## 2018-04-23 RX ADMIN — BRIMONIDINE TARTRATE 1 DROP: 2 SOLUTION OPHTHALMIC at 09:16

## 2018-04-23 RX ADMIN — POLYETHYLENE GLYCOL 3350 17 G: 17 POWDER, FOR SOLUTION ORAL at 09:16

## 2018-04-23 RX ADMIN — CETIRIZINE HYDROCHLORIDE 5 MG: 10 TABLET, FILM COATED ORAL at 09:17

## 2018-04-23 RX ADMIN — BISACODYL 10 MG: 10 SUPPOSITORY RECTAL at 09:17

## 2018-04-23 RX ADMIN — TIMOLOL MALEATE 1 DROP: 5 SOLUTION OPHTHALMIC at 09:16

## 2018-04-23 RX ADMIN — DOXEPIN HYDROCHLORIDE 50 MG: 50 CAPSULE ORAL at 13:34

## 2018-04-23 RX ADMIN — LATANOPROST 1 DROP: 50 SOLUTION OPHTHALMIC at 20:27

## 2018-04-23 RX ADMIN — Medication 10 ML: at 20:28

## 2018-04-23 RX ADMIN — PIPERACILLIN SODIUM AND TAZOBACTAM SODIUM 3.38 G: 3; .375 INJECTION, POWDER, LYOPHILIZED, FOR SOLUTION INTRAVENOUS at 23:34

## 2018-04-23 RX ADMIN — PIPERACILLIN SODIUM AND TAZOBACTAM SODIUM 3.38 G: 3; .375 INJECTION, POWDER, LYOPHILIZED, FOR SOLUTION INTRAVENOUS at 06:36

## 2018-04-23 RX ADMIN — PIPERACILLIN SODIUM AND TAZOBACTAM SODIUM 3.38 G: 3; .375 INJECTION, POWDER, LYOPHILIZED, FOR SOLUTION INTRAVENOUS at 14:30

## 2018-04-23 RX ADMIN — OXYCODONE HYDROCHLORIDE AND ACETAMINOPHEN 2 TABLET: 5; 325 TABLET ORAL at 16:12

## 2018-04-23 RX ADMIN — ENOXAPARIN SODIUM 40 MG: 40 INJECTION SUBCUTANEOUS at 09:16

## 2018-04-23 ASSESSMENT — PAIN DESCRIPTION - FREQUENCY
FREQUENCY: CONTINUOUS

## 2018-04-23 ASSESSMENT — PAIN DESCRIPTION - ONSET
ONSET: ON-GOING

## 2018-04-23 ASSESSMENT — PAIN SCALES - GENERAL
PAINLEVEL_OUTOF10: 6
PAINLEVEL_OUTOF10: 0
PAINLEVEL_OUTOF10: 6
PAINLEVEL_OUTOF10: 8

## 2018-04-23 ASSESSMENT — PAIN DESCRIPTION - ORIENTATION
ORIENTATION: MID

## 2018-04-23 ASSESSMENT — PAIN DESCRIPTION - DESCRIPTORS
DESCRIPTORS: CONSTANT;DISCOMFORT;ACHING
DESCRIPTORS: CONSTANT;DISCOMFORT;CRAMPING

## 2018-04-23 ASSESSMENT — PAIN DESCRIPTION - LOCATION
LOCATION: BACK

## 2018-04-23 ASSESSMENT — PAIN DESCRIPTION - PAIN TYPE
TYPE: ACUTE PAIN
TYPE: ACUTE PAIN;SURGICAL PAIN
TYPE: ACUTE PAIN;SURGICAL PAIN

## 2018-04-23 ASSESSMENT — PAIN DESCRIPTION - PROGRESSION
CLINICAL_PROGRESSION: NOT CHANGED

## 2018-04-24 LAB — URINE CULTURE, ROUTINE: NORMAL

## 2018-04-24 PROCEDURE — 97530 THERAPEUTIC ACTIVITIES: CPT

## 2018-04-24 PROCEDURE — 6360000002 HC RX W HCPCS: Performed by: PHYSICIAN ASSISTANT

## 2018-04-24 PROCEDURE — 6360000002 HC RX W HCPCS: Performed by: FAMILY MEDICINE

## 2018-04-24 PROCEDURE — 6370000000 HC RX 637 (ALT 250 FOR IP): Performed by: SURGERY

## 2018-04-24 PROCEDURE — 6370000000 HC RX 637 (ALT 250 FOR IP): Performed by: STUDENT IN AN ORGANIZED HEALTH CARE EDUCATION/TRAINING PROGRAM

## 2018-04-24 PROCEDURE — 6370000000 HC RX 637 (ALT 250 FOR IP): Performed by: PHYSICIAN ASSISTANT

## 2018-04-24 PROCEDURE — 6370000000 HC RX 637 (ALT 250 FOR IP): Performed by: NEUROLOGICAL SURGERY

## 2018-04-24 PROCEDURE — 99233 SBSQ HOSP IP/OBS HIGH 50: CPT | Performed by: SURGERY

## 2018-04-24 PROCEDURE — 6370000000 HC RX 637 (ALT 250 FOR IP): Performed by: FAMILY MEDICINE

## 2018-04-24 PROCEDURE — 2700000000 HC OXYGEN THERAPY PER DAY

## 2018-04-24 PROCEDURE — 2060000000 HC ICU INTERMEDIATE R&B

## 2018-04-24 PROCEDURE — 97535 SELF CARE MNGMENT TRAINING: CPT

## 2018-04-24 PROCEDURE — 2580000003 HC RX 258: Performed by: FAMILY MEDICINE

## 2018-04-24 PROCEDURE — 2580000003 HC RX 258: Performed by: PHYSICIAN ASSISTANT

## 2018-04-24 RX ADMIN — OXYCODONE HYDROCHLORIDE AND ACETAMINOPHEN 2 TABLET: 5; 325 TABLET ORAL at 09:04

## 2018-04-24 RX ADMIN — Medication 10 ML: at 20:40

## 2018-04-24 RX ADMIN — CETIRIZINE HYDROCHLORIDE 5 MG: 10 TABLET, FILM COATED ORAL at 09:12

## 2018-04-24 RX ADMIN — BISACODYL 10 MG: 10 SUPPOSITORY RECTAL at 09:15

## 2018-04-24 RX ADMIN — DIAZEPAM 5 MG: 5 TABLET ORAL at 23:29

## 2018-04-24 RX ADMIN — BRIMONIDINE TARTRATE 1 DROP: 2 SOLUTION OPHTHALMIC at 20:42

## 2018-04-24 RX ADMIN — POLYETHYLENE GLYCOL 3350 17 G: 17 POWDER, FOR SOLUTION ORAL at 09:12

## 2018-04-24 RX ADMIN — OXYCODONE HYDROCHLORIDE AND ACETAMINOPHEN 2 TABLET: 5; 325 TABLET ORAL at 04:52

## 2018-04-24 RX ADMIN — DOXEPIN HYDROCHLORIDE 50 MG: 50 CAPSULE ORAL at 09:12

## 2018-04-24 RX ADMIN — LEVOTHYROXINE SODIUM 50 MCG: 50 TABLET ORAL at 06:28

## 2018-04-24 RX ADMIN — BISACODYL 10 MG: 10 SUPPOSITORY RECTAL at 20:40

## 2018-04-24 RX ADMIN — TIMOLOL MALEATE 1 DROP: 5 SOLUTION OPHTHALMIC at 20:40

## 2018-04-24 RX ADMIN — STANDARDIZED SENNA CONCENTRATE AND DOCUSATE SODIUM 2 TABLET: 8.6; 5 TABLET, FILM COATED ORAL at 09:12

## 2018-04-24 RX ADMIN — ACETAMINOPHEN 500 MG: 500 TABLET, COATED ORAL at 23:52

## 2018-04-24 RX ADMIN — MAGNESIUM HYDROXIDE 30 ML: 400 SUSPENSION ORAL at 20:40

## 2018-04-24 RX ADMIN — PIPERACILLIN SODIUM AND TAZOBACTAM SODIUM 3.38 G: 3; .375 INJECTION, POWDER, LYOPHILIZED, FOR SOLUTION INTRAVENOUS at 14:59

## 2018-04-24 RX ADMIN — DOXEPIN HYDROCHLORIDE 50 MG: 50 CAPSULE ORAL at 13:05

## 2018-04-24 RX ADMIN — ENOXAPARIN SODIUM 40 MG: 40 INJECTION SUBCUTANEOUS at 09:12

## 2018-04-24 RX ADMIN — BRIMONIDINE TARTRATE 1 DROP: 2 SOLUTION OPHTHALMIC at 09:12

## 2018-04-24 RX ADMIN — LATANOPROST 1 DROP: 50 SOLUTION OPHTHALMIC at 20:40

## 2018-04-24 RX ADMIN — DOXEPIN HYDROCHLORIDE 50 MG: 50 CAPSULE ORAL at 17:15

## 2018-04-24 RX ADMIN — DOXEPIN HYDROCHLORIDE 50 MG: 50 CAPSULE ORAL at 20:41

## 2018-04-24 RX ADMIN — TIMOLOL MALEATE 1 DROP: 5 SOLUTION OPHTHALMIC at 09:12

## 2018-04-24 RX ADMIN — OXYCODONE HYDROCHLORIDE AND ACETAMINOPHEN 1 TABLET: 5; 325 TABLET ORAL at 15:04

## 2018-04-24 RX ADMIN — SENNOSIDES 8.6 MG: 8.6 TABLET ORAL at 20:48

## 2018-04-24 RX ADMIN — POLYETHYLENE GLYCOL 3350 17 G: 17 POWDER, FOR SOLUTION ORAL at 20:39

## 2018-04-24 RX ADMIN — PIPERACILLIN SODIUM AND TAZOBACTAM SODIUM 3.38 G: 3; .375 INJECTION, POWDER, LYOPHILIZED, FOR SOLUTION INTRAVENOUS at 06:58

## 2018-04-24 ASSESSMENT — PAIN DESCRIPTION - ORIENTATION
ORIENTATION: POSTERIOR
ORIENTATION: LOWER;MID

## 2018-04-24 ASSESSMENT — PAIN SCALES - GENERAL
PAINLEVEL_OUTOF10: 0
PAINLEVEL_OUTOF10: 10
PAINLEVEL_OUTOF10: 7
PAINLEVEL_OUTOF10: 0
PAINLEVEL_OUTOF10: 5
PAINLEVEL_OUTOF10: 0
PAINLEVEL_OUTOF10: 2

## 2018-04-24 ASSESSMENT — PAIN DESCRIPTION - PAIN TYPE
TYPE: SURGICAL PAIN
TYPE: ACUTE PAIN;SURGICAL PAIN

## 2018-04-24 ASSESSMENT — PAIN DESCRIPTION - FREQUENCY: FREQUENCY: CONTINUOUS

## 2018-04-24 ASSESSMENT — ENCOUNTER SYMPTOMS: NAUSEA: 0

## 2018-04-24 ASSESSMENT — PAIN DESCRIPTION - LOCATION
LOCATION: BACK
LOCATION: BACK

## 2018-04-24 ASSESSMENT — PAIN DESCRIPTION - PROGRESSION
CLINICAL_PROGRESSION: NOT CHANGED

## 2018-04-24 ASSESSMENT — PAIN DESCRIPTION - DESCRIPTORS: DESCRIPTORS: BURNING;RADIATING;SHARP

## 2018-04-24 ASSESSMENT — PAIN DESCRIPTION - ONSET: ONSET: ON-GOING

## 2018-04-25 ENCOUNTER — APPOINTMENT (OUTPATIENT)
Dept: CT IMAGING | Age: 78
DRG: 453 | End: 2018-04-25
Attending: NEUROLOGICAL SURGERY
Payer: MEDICARE

## 2018-04-25 PROCEDURE — 99233 SBSQ HOSP IP/OBS HIGH 50: CPT | Performed by: SURGERY

## 2018-04-25 PROCEDURE — 6370000000 HC RX 637 (ALT 250 FOR IP): Performed by: NEUROLOGICAL SURGERY

## 2018-04-25 PROCEDURE — 2580000003 HC RX 258: Performed by: PHYSICIAN ASSISTANT

## 2018-04-25 PROCEDURE — 1200000000 HC SEMI PRIVATE

## 2018-04-25 PROCEDURE — 2060000000 HC ICU INTERMEDIATE R&B

## 2018-04-25 PROCEDURE — 2700000000 HC OXYGEN THERAPY PER DAY

## 2018-04-25 PROCEDURE — 6370000000 HC RX 637 (ALT 250 FOR IP): Performed by: PHYSICIAN ASSISTANT

## 2018-04-25 PROCEDURE — 6370000000 HC RX 637 (ALT 250 FOR IP): Performed by: SURGERY

## 2018-04-25 PROCEDURE — 6370000000 HC RX 637 (ALT 250 FOR IP): Performed by: STUDENT IN AN ORGANIZED HEALTH CARE EDUCATION/TRAINING PROGRAM

## 2018-04-25 PROCEDURE — 74176 CT ABD & PELVIS W/O CONTRAST: CPT

## 2018-04-25 PROCEDURE — 2500000003 HC RX 250 WO HCPCS: Performed by: INTERNAL MEDICINE

## 2018-04-25 PROCEDURE — 6360000002 HC RX W HCPCS: Performed by: INTERNAL MEDICINE

## 2018-04-25 PROCEDURE — 6360000002 HC RX W HCPCS: Performed by: PHYSICIAN ASSISTANT

## 2018-04-25 RX ORDER — CIPROFLOXACIN 2 MG/ML
400 INJECTION, SOLUTION INTRAVENOUS EVERY 12 HOURS
Status: DISCONTINUED | OUTPATIENT
Start: 2018-04-25 | End: 2018-04-27

## 2018-04-25 RX ADMIN — SENNOSIDES 8.6 MG: 8.6 TABLET ORAL at 21:47

## 2018-04-25 RX ADMIN — LATANOPROST 1 DROP: 50 SOLUTION OPHTHALMIC at 20:32

## 2018-04-25 RX ADMIN — METRONIDAZOLE 500 MG: 500 INJECTION, SOLUTION INTRAVENOUS at 14:41

## 2018-04-25 RX ADMIN — OXYCODONE HYDROCHLORIDE AND ACETAMINOPHEN 2 TABLET: 5; 325 TABLET ORAL at 15:27

## 2018-04-25 RX ADMIN — CETIRIZINE HYDROCHLORIDE 5 MG: 10 TABLET, FILM COATED ORAL at 08:59

## 2018-04-25 RX ADMIN — OXYCODONE HYDROCHLORIDE AND ACETAMINOPHEN 2 TABLET: 5; 325 TABLET ORAL at 20:32

## 2018-04-25 RX ADMIN — ENOXAPARIN SODIUM 40 MG: 40 INJECTION SUBCUTANEOUS at 08:58

## 2018-04-25 RX ADMIN — OXYCODONE HYDROCHLORIDE AND ACETAMINOPHEN 2 TABLET: 5; 325 TABLET ORAL at 08:59

## 2018-04-25 RX ADMIN — DOXEPIN HYDROCHLORIDE 50 MG: 50 CAPSULE ORAL at 15:24

## 2018-04-25 RX ADMIN — DOXEPIN HYDROCHLORIDE 50 MG: 50 CAPSULE ORAL at 10:32

## 2018-04-25 RX ADMIN — CIPROFLOXACIN 400 MG: 2 INJECTION, SOLUTION INTRAVENOUS at 16:00

## 2018-04-25 RX ADMIN — METRONIDAZOLE 500 MG: 500 INJECTION, SOLUTION INTRAVENOUS at 21:46

## 2018-04-25 RX ADMIN — OXYCODONE HYDROCHLORIDE AND ACETAMINOPHEN 2 TABLET: 5; 325 TABLET ORAL at 03:56

## 2018-04-25 RX ADMIN — TIMOLOL MALEATE 1 DROP: 5 SOLUTION OPHTHALMIC at 20:32

## 2018-04-25 RX ADMIN — TIMOLOL MALEATE 1 DROP: 5 SOLUTION OPHTHALMIC at 08:59

## 2018-04-25 RX ADMIN — LEVOTHYROXINE SODIUM 50 MCG: 50 TABLET ORAL at 05:56

## 2018-04-25 RX ADMIN — BISACODYL 10 MG: 10 SUPPOSITORY RECTAL at 20:21

## 2018-04-25 RX ADMIN — Medication 10 ML: at 21:48

## 2018-04-25 RX ADMIN — POLYETHYLENE GLYCOL 3350 17 G: 17 POWDER, FOR SOLUTION ORAL at 20:24

## 2018-04-25 RX ADMIN — BRIMONIDINE TARTRATE 1 DROP: 2 SOLUTION OPHTHALMIC at 08:59

## 2018-04-25 RX ADMIN — BRIMONIDINE TARTRATE 1 DROP: 2 SOLUTION OPHTHALMIC at 20:32

## 2018-04-25 RX ADMIN — MAGNESIUM HYDROXIDE 30 ML: 400 SUSPENSION ORAL at 20:23

## 2018-04-25 RX ADMIN — Medication 10 ML: at 08:59

## 2018-04-25 ASSESSMENT — PAIN DESCRIPTION - DESCRIPTORS
DESCRIPTORS: ACHING;DISCOMFORT
DESCRIPTORS: CRAMPING
DESCRIPTORS: ACHING;DISCOMFORT;SORE

## 2018-04-25 ASSESSMENT — PAIN SCALES - GENERAL
PAINLEVEL_OUTOF10: 0
PAINLEVEL_OUTOF10: 3
PAINLEVEL_OUTOF10: 7
PAINLEVEL_OUTOF10: 0
PAINLEVEL_OUTOF10: 3
PAINLEVEL_OUTOF10: 8
PAINLEVEL_OUTOF10: 7
PAINLEVEL_OUTOF10: 8
PAINLEVEL_OUTOF10: 3
PAINLEVEL_OUTOF10: 0

## 2018-04-25 ASSESSMENT — PAIN DESCRIPTION - LOCATION
LOCATION: BACK;LEG
LOCATION: BACK
LOCATION: LEG

## 2018-04-25 ASSESSMENT — ENCOUNTER SYMPTOMS: NAUSEA: 0

## 2018-04-25 ASSESSMENT — PAIN DESCRIPTION - PROGRESSION: CLINICAL_PROGRESSION: NOT CHANGED

## 2018-04-25 ASSESSMENT — PAIN DESCRIPTION - FREQUENCY
FREQUENCY: CONTINUOUS
FREQUENCY: CONTINUOUS

## 2018-04-25 ASSESSMENT — PAIN DESCRIPTION - ONSET
ONSET: ON-GOING
ONSET: ON-GOING

## 2018-04-25 ASSESSMENT — PAIN DESCRIPTION - PAIN TYPE
TYPE: ACUTE PAIN;SURGICAL PAIN
TYPE: ACUTE PAIN
TYPE: ACUTE PAIN

## 2018-04-25 ASSESSMENT — PAIN DESCRIPTION - ORIENTATION: ORIENTATION: RIGHT;LEFT

## 2018-04-26 LAB
ANISOCYTOSIS: ABNORMAL
BASOPHILS ABSOLUTE: 0 E9/L (ref 0–0.2)
BASOPHILS RELATIVE PERCENT: 0.4 % (ref 0–2)
BLOOD CULTURE, ROUTINE: NORMAL
CULTURE, BLOOD 2: NORMAL
EOSINOPHILS ABSOLUTE: 0.31 E9/L (ref 0.05–0.5)
EOSINOPHILS RELATIVE PERCENT: 2.6 % (ref 0–6)
HCT VFR BLD CALC: 25.6 % (ref 34–48)
HEMOGLOBIN: 7.7 G/DL (ref 11.5–15.5)
HYPOCHROMIA: ABNORMAL
LYMPHOCYTES ABSOLUTE: 1.31 E9/L (ref 1.5–4)
LYMPHOCYTES RELATIVE PERCENT: 11.3 % (ref 20–42)
MCH RBC QN AUTO: 27.8 PG (ref 26–35)
MCHC RBC AUTO-ENTMCNC: 30.1 % (ref 32–34.5)
MCV RBC AUTO: 92.4 FL (ref 80–99.9)
MONOCYTES ABSOLUTE: 0.71 E9/L (ref 0.1–0.95)
MONOCYTES RELATIVE PERCENT: 6.1 % (ref 2–12)
MYELOCYTE PERCENT: 0.9 % (ref 0–0)
NEUTROPHILS ABSOLUTE: 9.52 E9/L (ref 1.8–7.3)
NEUTROPHILS RELATIVE PERCENT: 79.1 % (ref 43–80)
OVALOCYTES: ABNORMAL
PDW BLD-RTO: 14.1 FL (ref 11.5–15)
PLATELET # BLD: 679 E9/L (ref 130–450)
PMV BLD AUTO: 8.5 FL (ref 7–12)
POIKILOCYTES: ABNORMAL
POLYCHROMASIA: ABNORMAL
RBC # BLD: 2.77 E12/L (ref 3.5–5.5)
TARGET CELLS: ABNORMAL
WBC # BLD: 11.9 E9/L (ref 4.5–11.5)

## 2018-04-26 PROCEDURE — 6370000000 HC RX 637 (ALT 250 FOR IP): Performed by: STUDENT IN AN ORGANIZED HEALTH CARE EDUCATION/TRAINING PROGRAM

## 2018-04-26 PROCEDURE — 6370000000 HC RX 637 (ALT 250 FOR IP): Performed by: PHYSICIAN ASSISTANT

## 2018-04-26 PROCEDURE — 6360000002 HC RX W HCPCS: Performed by: INTERNAL MEDICINE

## 2018-04-26 PROCEDURE — 36415 COLL VENOUS BLD VENIPUNCTURE: CPT

## 2018-04-26 PROCEDURE — 97530 THERAPEUTIC ACTIVITIES: CPT

## 2018-04-26 PROCEDURE — 99233 SBSQ HOSP IP/OBS HIGH 50: CPT | Performed by: SURGERY

## 2018-04-26 PROCEDURE — 2500000003 HC RX 250 WO HCPCS: Performed by: INTERNAL MEDICINE

## 2018-04-26 PROCEDURE — 6370000000 HC RX 637 (ALT 250 FOR IP): Performed by: SURGERY

## 2018-04-26 PROCEDURE — 6370000000 HC RX 637 (ALT 250 FOR IP): Performed by: FAMILY MEDICINE

## 2018-04-26 PROCEDURE — 85025 COMPLETE CBC W/AUTO DIFF WBC: CPT

## 2018-04-26 PROCEDURE — 6360000002 HC RX W HCPCS: Performed by: PHYSICIAN ASSISTANT

## 2018-04-26 PROCEDURE — 1200000000 HC SEMI PRIVATE

## 2018-04-26 PROCEDURE — 97535 SELF CARE MNGMENT TRAINING: CPT

## 2018-04-26 PROCEDURE — 2700000000 HC OXYGEN THERAPY PER DAY

## 2018-04-26 PROCEDURE — 2580000003 HC RX 258: Performed by: PHYSICIAN ASSISTANT

## 2018-04-26 RX ADMIN — DOXEPIN HYDROCHLORIDE 50 MG: 50 CAPSULE ORAL at 00:26

## 2018-04-26 RX ADMIN — Medication 10 ML: at 13:39

## 2018-04-26 RX ADMIN — METRONIDAZOLE 500 MG: 500 INJECTION, SOLUTION INTRAVENOUS at 06:10

## 2018-04-26 RX ADMIN — METRONIDAZOLE 500 MG: 500 INJECTION, SOLUTION INTRAVENOUS at 22:05

## 2018-04-26 RX ADMIN — LATANOPROST 1 DROP: 50 SOLUTION OPHTHALMIC at 22:01

## 2018-04-26 RX ADMIN — ACETAMINOPHEN 500 MG: 500 TABLET, COATED ORAL at 08:36

## 2018-04-26 RX ADMIN — BISACODYL 10 MG: 10 SUPPOSITORY RECTAL at 08:36

## 2018-04-26 RX ADMIN — Medication 10 ML: at 08:40

## 2018-04-26 RX ADMIN — Medication 10 ML: at 22:01

## 2018-04-26 RX ADMIN — METRONIDAZOLE 500 MG: 500 INJECTION, SOLUTION INTRAVENOUS at 13:39

## 2018-04-26 RX ADMIN — STANDARDIZED SENNA CONCENTRATE AND DOCUSATE SODIUM 2 TABLET: 8.6; 5 TABLET, FILM COATED ORAL at 08:39

## 2018-04-26 RX ADMIN — DIAZEPAM 5 MG: 5 TABLET ORAL at 00:26

## 2018-04-26 RX ADMIN — Medication 10 ML: at 04:22

## 2018-04-26 RX ADMIN — TIMOLOL MALEATE 1 DROP: 5 SOLUTION OPHTHALMIC at 22:04

## 2018-04-26 RX ADMIN — ENOXAPARIN SODIUM 40 MG: 40 INJECTION SUBCUTANEOUS at 08:38

## 2018-04-26 RX ADMIN — DOXEPIN HYDROCHLORIDE 50 MG: 50 CAPSULE ORAL at 08:37

## 2018-04-26 RX ADMIN — DOXEPIN HYDROCHLORIDE 50 MG: 50 CAPSULE ORAL at 13:54

## 2018-04-26 RX ADMIN — OXYCODONE HYDROCHLORIDE AND ACETAMINOPHEN 1 TABLET: 5; 325 TABLET ORAL at 06:23

## 2018-04-26 RX ADMIN — DOXEPIN HYDROCHLORIDE 50 MG: 50 CAPSULE ORAL at 23:27

## 2018-04-26 RX ADMIN — TIMOLOL MALEATE 1 DROP: 5 SOLUTION OPHTHALMIC at 10:15

## 2018-04-26 RX ADMIN — CETIRIZINE HYDROCHLORIDE 5 MG: 10 TABLET, FILM COATED ORAL at 08:37

## 2018-04-26 RX ADMIN — Medication 10 ML: at 15:37

## 2018-04-26 RX ADMIN — CIPROFLOXACIN 400 MG: 2 INJECTION, SOLUTION INTRAVENOUS at 04:22

## 2018-04-26 RX ADMIN — OXYCODONE HYDROCHLORIDE AND ACETAMINOPHEN 1 TABLET: 5; 325 TABLET ORAL at 23:27

## 2018-04-26 RX ADMIN — LEVOTHYROXINE SODIUM 50 MCG: 50 TABLET ORAL at 06:10

## 2018-04-26 RX ADMIN — BRIMONIDINE TARTRATE 1 DROP: 2 SOLUTION OPHTHALMIC at 22:02

## 2018-04-26 RX ADMIN — POLYETHYLENE GLYCOL 3350 17 G: 17 POWDER, FOR SOLUTION ORAL at 08:38

## 2018-04-26 RX ADMIN — CIPROFLOXACIN 400 MG: 2 INJECTION, SOLUTION INTRAVENOUS at 15:33

## 2018-04-26 RX ADMIN — DOXEPIN HYDROCHLORIDE 50 MG: 50 CAPSULE ORAL at 16:48

## 2018-04-26 ASSESSMENT — PAIN SCALES - GENERAL
PAINLEVEL_OUTOF10: 2
PAINLEVEL_OUTOF10: 0
PAINLEVEL_OUTOF10: 8
PAINLEVEL_OUTOF10: 5
PAINLEVEL_OUTOF10: 6
PAINLEVEL_OUTOF10: 5

## 2018-04-26 ASSESSMENT — PAIN DESCRIPTION - LOCATION
LOCATION: BACK;LEG
LOCATION: BACK
LOCATION: BACK;LEG

## 2018-04-26 ASSESSMENT — PAIN DESCRIPTION - PAIN TYPE
TYPE: ACUTE PAIN;SURGICAL PAIN
TYPE: ACUTE PAIN;SURGICAL PAIN

## 2018-04-26 ASSESSMENT — PAIN DESCRIPTION - DESCRIPTORS
DESCRIPTORS: ACHING;DISCOMFORT
DESCRIPTORS: ACHING;DISCOMFORT
DESCRIPTORS: ACHING

## 2018-04-26 ASSESSMENT — ENCOUNTER SYMPTOMS: NAUSEA: 0

## 2018-04-26 ASSESSMENT — PAIN DESCRIPTION - FREQUENCY
FREQUENCY: INTERMITTENT
FREQUENCY: INTERMITTENT

## 2018-04-27 PROCEDURE — 6360000002 HC RX W HCPCS: Performed by: PHYSICIAN ASSISTANT

## 2018-04-27 PROCEDURE — 2580000003 HC RX 258: Performed by: PHYSICIAN ASSISTANT

## 2018-04-27 PROCEDURE — 2500000003 HC RX 250 WO HCPCS: Performed by: INTERNAL MEDICINE

## 2018-04-27 PROCEDURE — 6360000002 HC RX W HCPCS: Performed by: INTERNAL MEDICINE

## 2018-04-27 PROCEDURE — 99233 SBSQ HOSP IP/OBS HIGH 50: CPT | Performed by: SURGERY

## 2018-04-27 PROCEDURE — 51702 INSERT TEMP BLADDER CATH: CPT

## 2018-04-27 PROCEDURE — 6370000000 HC RX 637 (ALT 250 FOR IP): Performed by: PHYSICIAN ASSISTANT

## 2018-04-27 PROCEDURE — 1200000000 HC SEMI PRIVATE

## 2018-04-27 RX ADMIN — DOXEPIN HYDROCHLORIDE 50 MG: 50 CAPSULE ORAL at 17:46

## 2018-04-27 RX ADMIN — BRIMONIDINE TARTRATE 1 DROP: 2 SOLUTION OPHTHALMIC at 08:21

## 2018-04-27 RX ADMIN — BRIMONIDINE TARTRATE 1 DROP: 2 SOLUTION OPHTHALMIC at 20:42

## 2018-04-27 RX ADMIN — METRONIDAZOLE 500 MG: 500 INJECTION, SOLUTION INTRAVENOUS at 06:36

## 2018-04-27 RX ADMIN — CIPROFLOXACIN 400 MG: 2 INJECTION, SOLUTION INTRAVENOUS at 02:37

## 2018-04-27 RX ADMIN — ENOXAPARIN SODIUM 40 MG: 40 INJECTION SUBCUTANEOUS at 08:24

## 2018-04-27 RX ADMIN — TIMOLOL MALEATE 1 DROP: 5 SOLUTION OPHTHALMIC at 08:21

## 2018-04-27 RX ADMIN — LATANOPROST 1 DROP: 50 SOLUTION OPHTHALMIC at 20:42

## 2018-04-27 RX ADMIN — CETIRIZINE HYDROCHLORIDE 5 MG: 10 TABLET, FILM COATED ORAL at 08:21

## 2018-04-27 RX ADMIN — DOXEPIN HYDROCHLORIDE 50 MG: 50 CAPSULE ORAL at 13:32

## 2018-04-27 RX ADMIN — TIMOLOL MALEATE 1 DROP: 5 SOLUTION OPHTHALMIC at 20:42

## 2018-04-27 RX ADMIN — Medication 10 ML: at 20:49

## 2018-04-27 RX ADMIN — Medication 10 ML: at 13:32

## 2018-04-27 RX ADMIN — OXYCODONE HYDROCHLORIDE AND ACETAMINOPHEN 2 TABLET: 5; 325 TABLET ORAL at 20:49

## 2018-04-27 RX ADMIN — Medication 10 ML: at 02:37

## 2018-04-27 RX ADMIN — OXYCODONE HYDROCHLORIDE AND ACETAMINOPHEN 1 TABLET: 5; 325 TABLET ORAL at 04:36

## 2018-04-27 RX ADMIN — DOXEPIN HYDROCHLORIDE 50 MG: 50 CAPSULE ORAL at 20:49

## 2018-04-27 RX ADMIN — DOXEPIN HYDROCHLORIDE 50 MG: 50 CAPSULE ORAL at 08:25

## 2018-04-27 RX ADMIN — Medication 10 ML: at 08:22

## 2018-04-27 RX ADMIN — DIAZEPAM 5 MG: 5 TABLET ORAL at 23:00

## 2018-04-27 RX ADMIN — LEVOTHYROXINE SODIUM 50 MCG: 50 TABLET ORAL at 06:36

## 2018-04-27 RX ADMIN — CIPROFLOXACIN 400 MG: 2 INJECTION, SOLUTION INTRAVENOUS at 13:32

## 2018-04-27 RX ADMIN — DIAZEPAM 5 MG: 5 TABLET ORAL at 03:05

## 2018-04-27 ASSESSMENT — PAIN DESCRIPTION - ONSET: ONSET: ON-GOING

## 2018-04-27 ASSESSMENT — PAIN SCALES - GENERAL
PAINLEVEL_OUTOF10: 4
PAINLEVEL_OUTOF10: 7
PAINLEVEL_OUTOF10: 10
PAINLEVEL_OUTOF10: 0
PAINLEVEL_OUTOF10: 10
PAINLEVEL_OUTOF10: 10

## 2018-04-27 ASSESSMENT — PAIN DESCRIPTION - DESCRIPTORS: DESCRIPTORS: ACHING;CONSTANT;DISCOMFORT

## 2018-04-27 ASSESSMENT — PAIN DESCRIPTION - FREQUENCY: FREQUENCY: CONTINUOUS

## 2018-04-27 ASSESSMENT — PAIN DESCRIPTION - PAIN TYPE: TYPE: CHRONIC PAIN

## 2018-04-27 ASSESSMENT — PAIN DESCRIPTION - ORIENTATION: ORIENTATION: POSTERIOR

## 2018-04-27 ASSESSMENT — PAIN DESCRIPTION - LOCATION: LOCATION: NECK

## 2018-04-27 ASSESSMENT — ENCOUNTER SYMPTOMS: NAUSEA: 0

## 2018-04-27 ASSESSMENT — PAIN DESCRIPTION - DIRECTION: RADIATING_TOWARDS: BACK

## 2018-04-28 PROCEDURE — 6370000000 HC RX 637 (ALT 250 FOR IP): Performed by: PHYSICIAN ASSISTANT

## 2018-04-28 PROCEDURE — 6360000002 HC RX W HCPCS: Performed by: PHYSICIAN ASSISTANT

## 2018-04-28 PROCEDURE — 2580000003 HC RX 258: Performed by: PHYSICIAN ASSISTANT

## 2018-04-28 PROCEDURE — 6370000000 HC RX 637 (ALT 250 FOR IP): Performed by: FAMILY MEDICINE

## 2018-04-28 PROCEDURE — 99233 SBSQ HOSP IP/OBS HIGH 50: CPT | Performed by: SURGERY

## 2018-04-28 PROCEDURE — 1200000000 HC SEMI PRIVATE

## 2018-04-28 RX ADMIN — OXYCODONE HYDROCHLORIDE AND ACETAMINOPHEN 2 TABLET: 5; 325 TABLET ORAL at 11:53

## 2018-04-28 RX ADMIN — DOXEPIN HYDROCHLORIDE 50 MG: 50 CAPSULE ORAL at 20:53

## 2018-04-28 RX ADMIN — TIMOLOL MALEATE 1 DROP: 5 SOLUTION OPHTHALMIC at 09:09

## 2018-04-28 RX ADMIN — DOXEPIN HYDROCHLORIDE 50 MG: 50 CAPSULE ORAL at 13:49

## 2018-04-28 RX ADMIN — OXYCODONE HYDROCHLORIDE AND ACETAMINOPHEN 2 TABLET: 5; 325 TABLET ORAL at 23:27

## 2018-04-28 RX ADMIN — LEVOTHYROXINE SODIUM 50 MCG: 50 TABLET ORAL at 06:49

## 2018-04-28 RX ADMIN — CETIRIZINE HYDROCHLORIDE 5 MG: 10 TABLET, FILM COATED ORAL at 09:08

## 2018-04-28 RX ADMIN — ENOXAPARIN SODIUM 40 MG: 40 INJECTION SUBCUTANEOUS at 09:09

## 2018-04-28 RX ADMIN — OXYCODONE HYDROCHLORIDE AND ACETAMINOPHEN 2 TABLET: 5; 325 TABLET ORAL at 19:23

## 2018-04-28 RX ADMIN — ACETAMINOPHEN 500 MG: 500 TABLET, COATED ORAL at 13:59

## 2018-04-28 RX ADMIN — TIMOLOL MALEATE 1 DROP: 5 SOLUTION OPHTHALMIC at 21:00

## 2018-04-28 RX ADMIN — BRIMONIDINE TARTRATE 1 DROP: 2 SOLUTION OPHTHALMIC at 21:00

## 2018-04-28 RX ADMIN — LATANOPROST 1 DROP: 50 SOLUTION OPHTHALMIC at 21:00

## 2018-04-28 RX ADMIN — OXYCODONE HYDROCHLORIDE AND ACETAMINOPHEN 2 TABLET: 5; 325 TABLET ORAL at 04:51

## 2018-04-28 RX ADMIN — DOXEPIN HYDROCHLORIDE 50 MG: 50 CAPSULE ORAL at 09:09

## 2018-04-28 RX ADMIN — Medication 10 ML: at 09:09

## 2018-04-28 RX ADMIN — DOXEPIN HYDROCHLORIDE 50 MG: 50 CAPSULE ORAL at 17:37

## 2018-04-28 RX ADMIN — BRIMONIDINE TARTRATE 1 DROP: 2 SOLUTION OPHTHALMIC at 09:09

## 2018-04-28 RX ADMIN — OXYCODONE HYDROCHLORIDE AND ACETAMINOPHEN 2 TABLET: 5; 325 TABLET ORAL at 00:50

## 2018-04-28 ASSESSMENT — PAIN SCALES - GENERAL
PAINLEVEL_OUTOF10: 7
PAINLEVEL_OUTOF10: 8
PAINLEVEL_OUTOF10: 7
PAINLEVEL_OUTOF10: 7
PAINLEVEL_OUTOF10: 10
PAINLEVEL_OUTOF10: 0
PAINLEVEL_OUTOF10: 10

## 2018-04-28 ASSESSMENT — PAIN DESCRIPTION - FREQUENCY
FREQUENCY: INTERMITTENT
FREQUENCY: INTERMITTENT

## 2018-04-28 ASSESSMENT — PAIN DESCRIPTION - PAIN TYPE
TYPE: SURGICAL PAIN
TYPE: SURGICAL PAIN
TYPE: ACUTE PAIN

## 2018-04-28 ASSESSMENT — PAIN DESCRIPTION - LOCATION
LOCATION: NECK;BACK
LOCATION: NECK;BACK
LOCATION: BACK;NECK

## 2018-04-28 ASSESSMENT — PAIN DESCRIPTION - DESCRIPTORS
DESCRIPTORS: ACHING;DISCOMFORT
DESCRIPTORS: ACHING
DESCRIPTORS: ACHING

## 2018-04-29 PROCEDURE — 2580000003 HC RX 258: Performed by: PHYSICIAN ASSISTANT

## 2018-04-29 PROCEDURE — 6360000002 HC RX W HCPCS: Performed by: PHYSICIAN ASSISTANT

## 2018-04-29 PROCEDURE — 6370000000 HC RX 637 (ALT 250 FOR IP): Performed by: PHYSICIAN ASSISTANT

## 2018-04-29 PROCEDURE — 1200000000 HC SEMI PRIVATE

## 2018-04-29 PROCEDURE — 6370000000 HC RX 637 (ALT 250 FOR IP): Performed by: NEUROLOGICAL SURGERY

## 2018-04-29 RX ORDER — PETROLATUM 42 G/100G
OINTMENT TOPICAL 3 TIMES DAILY
Status: DISCONTINUED | OUTPATIENT
Start: 2018-04-29 | End: 2018-04-30 | Stop reason: HOSPADM

## 2018-04-29 RX ADMIN — Medication 10 ML: at 20:45

## 2018-04-29 RX ADMIN — LEVOTHYROXINE SODIUM 50 MCG: 50 TABLET ORAL at 06:37

## 2018-04-29 RX ADMIN — ENOXAPARIN SODIUM 40 MG: 40 INJECTION SUBCUTANEOUS at 08:28

## 2018-04-29 RX ADMIN — DOXEPIN HYDROCHLORIDE 50 MG: 50 CAPSULE ORAL at 12:53

## 2018-04-29 RX ADMIN — DOXEPIN HYDROCHLORIDE 50 MG: 50 CAPSULE ORAL at 17:27

## 2018-04-29 RX ADMIN — TIMOLOL MALEATE 1 DROP: 5 SOLUTION OPHTHALMIC at 20:44

## 2018-04-29 RX ADMIN — DOXEPIN HYDROCHLORIDE 50 MG: 50 CAPSULE ORAL at 09:00

## 2018-04-29 RX ADMIN — LATANOPROST 1 DROP: 50 SOLUTION OPHTHALMIC at 20:45

## 2018-04-29 RX ADMIN — OXYCODONE HYDROCHLORIDE AND ACETAMINOPHEN 2 TABLET: 5; 325 TABLET ORAL at 08:31

## 2018-04-29 RX ADMIN — PETROLATUM: 42 OINTMENT TOPICAL at 20:44

## 2018-04-29 RX ADMIN — BRIMONIDINE TARTRATE 1 DROP: 2 SOLUTION OPHTHALMIC at 20:44

## 2018-04-29 RX ADMIN — OXYCODONE HYDROCHLORIDE AND ACETAMINOPHEN 2 TABLET: 5; 325 TABLET ORAL at 03:49

## 2018-04-29 RX ADMIN — OXYCODONE HYDROCHLORIDE AND ACETAMINOPHEN 2 TABLET: 5; 325 TABLET ORAL at 14:02

## 2018-04-29 RX ADMIN — OXYCODONE HYDROCHLORIDE AND ACETAMINOPHEN 1 TABLET: 5; 325 TABLET ORAL at 20:54

## 2018-04-29 RX ADMIN — PETROLATUM: 42 OINTMENT TOPICAL at 17:27

## 2018-04-29 RX ADMIN — Medication 10 ML: at 08:30

## 2018-04-29 RX ADMIN — BRIMONIDINE TARTRATE 1 DROP: 2 SOLUTION OPHTHALMIC at 08:26

## 2018-04-29 RX ADMIN — TIMOLOL MALEATE 1 DROP: 5 SOLUTION OPHTHALMIC at 08:26

## 2018-04-29 RX ADMIN — CETIRIZINE HYDROCHLORIDE 5 MG: 10 TABLET, FILM COATED ORAL at 08:26

## 2018-04-29 RX ADMIN — DOXEPIN HYDROCHLORIDE 50 MG: 50 CAPSULE ORAL at 21:40

## 2018-04-29 ASSESSMENT — PAIN SCALES - GENERAL
PAINLEVEL_OUTOF10: 8
PAINLEVEL_OUTOF10: 7
PAINLEVEL_OUTOF10: 0
PAINLEVEL_OUTOF10: 5
PAINLEVEL_OUTOF10: 0
PAINLEVEL_OUTOF10: 5

## 2018-04-29 ASSESSMENT — PAIN DESCRIPTION - PAIN TYPE
TYPE: SURGICAL PAIN
TYPE: SURGICAL PAIN

## 2018-04-29 ASSESSMENT — PAIN DESCRIPTION - DESCRIPTORS
DESCRIPTORS: ACHING
DESCRIPTORS: ACHING

## 2018-04-29 ASSESSMENT — PAIN DESCRIPTION - LOCATION
LOCATION: BACK
LOCATION: BACK

## 2018-04-30 VITALS
HEIGHT: 63 IN | SYSTOLIC BLOOD PRESSURE: 144 MMHG | OXYGEN SATURATION: 95 % | TEMPERATURE: 98.9 F | BODY MASS INDEX: 26.22 KG/M2 | RESPIRATION RATE: 16 BRPM | HEART RATE: 96 BPM | WEIGHT: 148 LBS | DIASTOLIC BLOOD PRESSURE: 65 MMHG

## 2018-04-30 PROCEDURE — 6370000000 HC RX 637 (ALT 250 FOR IP): Performed by: PHYSICIAN ASSISTANT

## 2018-04-30 PROCEDURE — 6360000002 HC RX W HCPCS: Performed by: PHYSICIAN ASSISTANT

## 2018-04-30 PROCEDURE — 2580000003 HC RX 258: Performed by: PHYSICIAN ASSISTANT

## 2018-04-30 RX ADMIN — CETIRIZINE HYDROCHLORIDE 5 MG: 10 TABLET, FILM COATED ORAL at 08:35

## 2018-04-30 RX ADMIN — OXYCODONE HYDROCHLORIDE AND ACETAMINOPHEN 1 TABLET: 5; 325 TABLET ORAL at 04:35

## 2018-04-30 RX ADMIN — TIMOLOL MALEATE 1 DROP: 5 SOLUTION OPHTHALMIC at 08:35

## 2018-04-30 RX ADMIN — OXYCODONE HYDROCHLORIDE AND ACETAMINOPHEN 2 TABLET: 5; 325 TABLET ORAL at 08:35

## 2018-04-30 RX ADMIN — DIAZEPAM 5 MG: 5 TABLET ORAL at 06:32

## 2018-04-30 RX ADMIN — ENOXAPARIN SODIUM 40 MG: 40 INJECTION SUBCUTANEOUS at 08:35

## 2018-04-30 RX ADMIN — LEVOTHYROXINE SODIUM 50 MCG: 50 TABLET ORAL at 06:32

## 2018-04-30 RX ADMIN — Medication 10 ML: at 08:37

## 2018-04-30 RX ADMIN — BRIMONIDINE TARTRATE 1 DROP: 2 SOLUTION OPHTHALMIC at 08:36

## 2018-04-30 RX ADMIN — PETROLATUM: 42 OINTMENT TOPICAL at 08:37

## 2018-04-30 RX ADMIN — DOXEPIN HYDROCHLORIDE 50 MG: 50 CAPSULE ORAL at 08:35

## 2018-04-30 ASSESSMENT — PAIN SCALES - GENERAL
PAINLEVEL_OUTOF10: 6
PAINLEVEL_OUTOF10: 0
PAINLEVEL_OUTOF10: 8

## 2018-04-30 ASSESSMENT — PAIN DESCRIPTION - LOCATION: LOCATION: BACK

## 2018-04-30 ASSESSMENT — PAIN DESCRIPTION - PROGRESSION: CLINICAL_PROGRESSION: NOT CHANGED

## 2018-04-30 ASSESSMENT — PAIN DESCRIPTION - FREQUENCY: FREQUENCY: CONTINUOUS

## 2018-04-30 ASSESSMENT — PAIN DESCRIPTION - PAIN TYPE: TYPE: ACUTE PAIN

## 2018-04-30 ASSESSMENT — PAIN DESCRIPTION - ONSET: ONSET: AWAKENED FROM SLEEP

## 2018-04-30 ASSESSMENT — PAIN DESCRIPTION - ORIENTATION: ORIENTATION: LOWER;RIGHT;LEFT

## 2018-04-30 ASSESSMENT — PAIN DESCRIPTION - DESCRIPTORS: DESCRIPTORS: ACHING

## 2018-05-11 DIAGNOSIS — M48.062 LUMBAR STENOSIS WITH NEUROGENIC CLAUDICATION: Primary | ICD-10-CM

## 2018-05-16 ENCOUNTER — HOSPITAL ENCOUNTER (INPATIENT)
Age: 78
LOS: 5 days | Discharge: NURSING, REHAB OR LONG-TERM CARE | DRG: 299 | End: 2018-05-21
Attending: EMERGENCY MEDICINE | Admitting: FAMILY MEDICINE
Payer: MEDICARE

## 2018-05-16 ENCOUNTER — APPOINTMENT (OUTPATIENT)
Dept: GENERAL RADIOLOGY | Age: 78
DRG: 299 | End: 2018-05-16
Payer: MEDICARE

## 2018-05-16 ENCOUNTER — APPOINTMENT (OUTPATIENT)
Dept: CT IMAGING | Age: 78
DRG: 299 | End: 2018-05-16
Payer: MEDICARE

## 2018-05-16 DIAGNOSIS — E43 SEVERE PROTEIN-CALORIE MALNUTRITION (HCC): ICD-10-CM

## 2018-05-16 DIAGNOSIS — R65.21 SEPTIC SHOCK (HCC): ICD-10-CM

## 2018-05-16 DIAGNOSIS — I82.411 ACUTE DEEP VEIN THROMBOSIS (DVT) OF FEMORAL VEIN OF RIGHT LOWER EXTREMITY (HCC): ICD-10-CM

## 2018-05-16 DIAGNOSIS — A41.9 SEPTIC SHOCK (HCC): ICD-10-CM

## 2018-05-16 DIAGNOSIS — N39.0 URINARY TRACT INFECTION WITHOUT HEMATURIA, SITE UNSPECIFIED: ICD-10-CM

## 2018-05-16 DIAGNOSIS — R41.82 ALTERED MENTAL STATUS, UNSPECIFIED ALTERED MENTAL STATUS TYPE: Primary | ICD-10-CM

## 2018-05-16 DIAGNOSIS — M48.062 LUMBAR STENOSIS WITH NEUROGENIC CLAUDICATION: ICD-10-CM

## 2018-05-16 LAB
ALBUMIN SERPL-MCNC: 3.1 G/DL (ref 3.5–5.2)
ALP BLD-CCNC: 151 U/L (ref 35–104)
ALT SERPL-CCNC: 15 U/L (ref 0–32)
ANION GAP SERPL CALCULATED.3IONS-SCNC: 15 MMOL/L (ref 7–16)
AST SERPL-CCNC: 33 U/L (ref 0–31)
B.E.: -0.6 MMOL/L (ref -3–3)
BACTERIA: ABNORMAL /HPF
BASOPHILS ABSOLUTE: 0.06 E9/L (ref 0–0.2)
BASOPHILS RELATIVE PERCENT: 0.3 % (ref 0–2)
BILIRUB SERPL-MCNC: 0.5 MG/DL (ref 0–1.2)
BILIRUBIN DIRECT: <0.2 MG/DL (ref 0–0.3)
BILIRUBIN URINE: ABNORMAL
BILIRUBIN, INDIRECT: ABNORMAL MG/DL (ref 0–1)
BLOOD, URINE: NEGATIVE
BUN BLDV-MCNC: 14 MG/DL (ref 8–23)
CALCIUM SERPL-MCNC: 8.8 MG/DL (ref 8.6–10.2)
CHLORIDE BLD-SCNC: 98 MMOL/L (ref 98–107)
CLARITY: ABNORMAL
CO2: 28 MMOL/L (ref 22–29)
COHB: 0.7 % (ref 0–1.5)
COLOR: ABNORMAL
COMMENT: ABNORMAL
CREAT SERPL-MCNC: 0.9 MG/DL (ref 0.5–1)
CRITICAL: ABNORMAL
DATE ANALYZED: ABNORMAL
DATE OF COLLECTION: ABNORMAL
EKG ATRIAL RATE: 107 BPM
EKG P AXIS: 54 DEGREES
EKG P-R INTERVAL: 180 MS
EKG Q-T INTERVAL: 344 MS
EKG QRS DURATION: 88 MS
EKG QTC CALCULATION (BAZETT): 459 MS
EKG R AXIS: -25 DEGREES
EKG T AXIS: 56 DEGREES
EKG VENTRICULAR RATE: 107 BPM
EOSINOPHILS ABSOLUTE: 0.03 E9/L (ref 0.05–0.5)
EOSINOPHILS RELATIVE PERCENT: 0.2 % (ref 0–6)
EPITHELIAL CELLS, UA: ABNORMAL /HPF
GFR AFRICAN AMERICAN: >60
GFR NON-AFRICAN AMERICAN: >60 ML/MIN/1.73
GLUCOSE BLD-MCNC: 118 MG/DL (ref 74–109)
GLUCOSE URINE: NEGATIVE MG/DL
HCO3: 23.5 MMOL/L (ref 22–26)
HCT VFR BLD CALC: 29.3 % (ref 34–48)
HEMOGLOBIN: 8.9 G/DL (ref 11.5–15.5)
HHB: 7.4 % (ref 0–5)
IMMATURE GRANULOCYTES #: 0.2 E9/L
IMMATURE GRANULOCYTES %: 1.2 % (ref 0–5)
KETONES, URINE: 15 MG/DL
LAB: 9558
LACTIC ACID: 1.1 MMOL/L (ref 0.5–2.2)
LEUKOCYTE ESTERASE, URINE: NEGATIVE
LYMPHOCYTES ABSOLUTE: 1.07 E9/L (ref 1.5–4)
LYMPHOCYTES RELATIVE PERCENT: 6.2 % (ref 20–42)
Lab: 1845
MCH RBC QN AUTO: 27.1 PG (ref 26–35)
MCHC RBC AUTO-ENTMCNC: 30.4 % (ref 32–34.5)
MCV RBC AUTO: 89.3 FL (ref 80–99.9)
METHB: 0.4 % (ref 0–1.5)
MONOCYTES ABSOLUTE: 1.39 E9/L (ref 0.1–0.95)
MONOCYTES RELATIVE PERCENT: 8.1 % (ref 2–12)
NEUTROPHILS ABSOLUTE: 14.47 E9/L (ref 1.8–7.3)
NEUTROPHILS RELATIVE PERCENT: 84 % (ref 43–80)
NITRITE, URINE: NEGATIVE
O2 CONTENT: 10.6 ML/DL
O2 SATURATION: 92.5 % (ref 92–98.5)
O2HB: 91.5 % (ref 94–97)
OPERATOR ID: 1893
PATIENT TEMP: 37 C
PCO2: 36 MMHG (ref 35–45)
PDW BLD-RTO: 15.4 FL (ref 11.5–15)
PH BLOOD GAS: 7.43 (ref 7.35–7.45)
PH UA: 5.5 (ref 5–9)
PLATELET # BLD: 416 E9/L (ref 130–450)
PMV BLD AUTO: 9.4 FL (ref 7–12)
PO2: 67.2 MMHG (ref 60–100)
POTASSIUM SERPL-SCNC: 3.6 MMOL/L (ref 3.5–5)
PROTEIN UA: 30 MG/DL
RBC # BLD: 3.28 E12/L (ref 3.5–5.5)
RBC UA: ABNORMAL /HPF (ref 0–2)
SODIUM BLD-SCNC: 141 MMOL/L (ref 132–146)
SOURCE, BLOOD GAS: ABNORMAL
SPECIFIC GRAVITY UA: >=1.03 (ref 1–1.03)
THB: 8.2 G/DL (ref 11.5–16.5)
TIME ANALYZED: 1858
TOTAL PROTEIN: 7.6 G/DL (ref 6.4–8.3)
TSH SERPL DL<=0.05 MIU/L-ACNC: 1.28 UIU/ML (ref 0.27–4.2)
UROBILINOGEN, URINE: 0.2 E.U./DL
WBC # BLD: 17.2 E9/L (ref 4.5–11.5)
WBC UA: ABNORMAL /HPF (ref 0–5)

## 2018-05-16 PROCEDURE — 87040 BLOOD CULTURE FOR BACTERIA: CPT

## 2018-05-16 PROCEDURE — 74176 CT ABD & PELVIS W/O CONTRAST: CPT

## 2018-05-16 PROCEDURE — 80076 HEPATIC FUNCTION PANEL: CPT

## 2018-05-16 PROCEDURE — 80048 BASIC METABOLIC PNL TOTAL CA: CPT

## 2018-05-16 PROCEDURE — 85025 COMPLETE CBC W/AUTO DIFF WBC: CPT

## 2018-05-16 PROCEDURE — 2500000003 HC RX 250 WO HCPCS: Performed by: EMERGENCY MEDICINE

## 2018-05-16 PROCEDURE — 6360000002 HC RX W HCPCS: Performed by: EMERGENCY MEDICINE

## 2018-05-16 PROCEDURE — 81001 URINALYSIS AUTO W/SCOPE: CPT

## 2018-05-16 PROCEDURE — 96365 THER/PROPH/DIAG IV INF INIT: CPT

## 2018-05-16 PROCEDURE — 83605 ASSAY OF LACTIC ACID: CPT

## 2018-05-16 PROCEDURE — 71045 X-RAY EXAM CHEST 1 VIEW: CPT

## 2018-05-16 PROCEDURE — 82805 BLOOD GASES W/O2 SATURATION: CPT

## 2018-05-16 PROCEDURE — 6370000000 HC RX 637 (ALT 250 FOR IP): Performed by: EMERGENCY MEDICINE

## 2018-05-16 PROCEDURE — 70450 CT HEAD/BRAIN W/O DYE: CPT

## 2018-05-16 PROCEDURE — 99223 1ST HOSP IP/OBS HIGH 75: CPT | Performed by: INTERNAL MEDICINE

## 2018-05-16 PROCEDURE — 2580000003 HC RX 258: Performed by: EMERGENCY MEDICINE

## 2018-05-16 PROCEDURE — 93005 ELECTROCARDIOGRAM TRACING: CPT | Performed by: EMERGENCY MEDICINE

## 2018-05-16 PROCEDURE — 84443 ASSAY THYROID STIM HORMONE: CPT

## 2018-05-16 PROCEDURE — 2000000000 HC ICU R&B

## 2018-05-16 PROCEDURE — 99285 EMERGENCY DEPT VISIT HI MDM: CPT

## 2018-05-16 PROCEDURE — 36415 COLL VENOUS BLD VENIPUNCTURE: CPT

## 2018-05-16 RX ORDER — ACETAMINOPHEN 500 MG
1000 TABLET ORAL ONCE
Status: DISCONTINUED | OUTPATIENT
Start: 2018-05-16 | End: 2018-05-16

## 2018-05-16 RX ORDER — ASCORBIC ACID 500 MG
1000 TABLET ORAL DAILY
Status: DISCONTINUED | OUTPATIENT
Start: 2018-05-17 | End: 2018-05-21 | Stop reason: HOSPADM

## 2018-05-16 RX ORDER — OXYCODONE HYDROCHLORIDE AND ACETAMINOPHEN 5; 325 MG/1; MG/1
2 TABLET ORAL EVERY 4 HOURS PRN
Status: DISCONTINUED | OUTPATIENT
Start: 2018-05-16 | End: 2018-05-21 | Stop reason: HOSPADM

## 2018-05-16 RX ORDER — 0.9 % SODIUM CHLORIDE 0.9 %
1000 INTRAVENOUS SOLUTION INTRAVENOUS ONCE
Status: COMPLETED | OUTPATIENT
Start: 2018-05-16 | End: 2018-05-16

## 2018-05-16 RX ORDER — ACETAMINOPHEN 325 MG/1
650 TABLET ORAL EVERY 6 HOURS PRN
Status: DISCONTINUED | OUTPATIENT
Start: 2018-05-16 | End: 2018-05-16 | Stop reason: SDUPTHER

## 2018-05-16 RX ORDER — LATANOPROST 50 UG/ML
1 SOLUTION/ DROPS OPHTHALMIC NIGHTLY
Status: DISCONTINUED | OUTPATIENT
Start: 2018-05-16 | End: 2018-05-21 | Stop reason: HOSPADM

## 2018-05-16 RX ORDER — MELOXICAM 15 MG/1
15 TABLET ORAL DAILY
Status: ON HOLD | COMMUNITY
End: 2018-05-21 | Stop reason: HOSPADM

## 2018-05-16 RX ORDER — PANTOPRAZOLE SODIUM 40 MG/10ML
40 INJECTION, POWDER, LYOPHILIZED, FOR SOLUTION INTRAVENOUS DAILY
Status: DISCONTINUED | OUTPATIENT
Start: 2018-05-17 | End: 2018-05-21

## 2018-05-16 RX ORDER — MELOXICAM 7.5 MG/1
15 TABLET ORAL DAILY
Status: DISCONTINUED | OUTPATIENT
Start: 2018-05-17 | End: 2018-05-16

## 2018-05-16 RX ORDER — SODIUM CHLORIDE 9 MG/ML
INJECTION, SOLUTION INTRAVENOUS CONTINUOUS
Status: DISCONTINUED | OUTPATIENT
Start: 2018-05-16 | End: 2018-05-20

## 2018-05-16 RX ORDER — BRIMONIDINE TARTRATE, TIMOLOL MALEATE 2; 5 MG/ML; MG/ML
1 SOLUTION/ DROPS OPHTHALMIC 2 TIMES DAILY
Status: DISCONTINUED | OUTPATIENT
Start: 2018-05-16 | End: 2018-05-16 | Stop reason: CLARIF

## 2018-05-16 RX ORDER — DIAZEPAM 5 MG/1
5 TABLET ORAL EVERY 8 HOURS PRN
Status: DISCONTINUED | OUTPATIENT
Start: 2018-05-16 | End: 2018-05-21 | Stop reason: HOSPADM

## 2018-05-16 RX ORDER — KETOCONAZOLE 20 MG/ML
1 SHAMPOO TOPICAL
Status: DISCONTINUED | OUTPATIENT
Start: 2018-05-16 | End: 2018-05-20

## 2018-05-16 RX ORDER — ACETAMINOPHEN 325 MG/1
650 TABLET ORAL EVERY 4 HOURS PRN
Status: DISCONTINUED | OUTPATIENT
Start: 2018-05-16 | End: 2018-05-21 | Stop reason: HOSPADM

## 2018-05-16 RX ORDER — LATANOPROST 50 UG/ML
1 SOLUTION/ DROPS OPHTHALMIC NIGHTLY
Refills: 11 | Status: ON HOLD | COMMUNITY
Start: 2018-05-09 | End: 2019-12-10

## 2018-05-16 RX ORDER — KETOROLAC TROMETHAMINE 30 MG/ML
15 INJECTION, SOLUTION INTRAMUSCULAR; INTRAVENOUS ONCE
Status: COMPLETED | OUTPATIENT
Start: 2018-05-16 | End: 2018-05-16

## 2018-05-16 RX ORDER — TIMOLOL MALEATE 5 MG/ML
1 SOLUTION/ DROPS OPHTHALMIC 2 TIMES DAILY
Status: DISCONTINUED | OUTPATIENT
Start: 2018-05-16 | End: 2018-05-21 | Stop reason: HOSPADM

## 2018-05-16 RX ORDER — BRIMONIDINE TARTRATE 2 MG/ML
1 SOLUTION/ DROPS OPHTHALMIC 2 TIMES DAILY
Status: DISCONTINUED | OUTPATIENT
Start: 2018-05-16 | End: 2018-05-21 | Stop reason: HOSPADM

## 2018-05-16 RX ORDER — SODIUM CHLORIDE 0.9 % (FLUSH) 0.9 %
10 SYRINGE (ML) INJECTION PRN
Status: DISCONTINUED | OUTPATIENT
Start: 2018-05-16 | End: 2018-05-21 | Stop reason: HOSPADM

## 2018-05-16 RX ORDER — SODIUM CHLORIDE 0.9 % (FLUSH) 0.9 %
10 SYRINGE (ML) INJECTION EVERY 12 HOURS SCHEDULED
Status: DISCONTINUED | OUTPATIENT
Start: 2018-05-16 | End: 2018-05-21 | Stop reason: HOSPADM

## 2018-05-16 RX ORDER — CETIRIZINE HYDROCHLORIDE 10 MG/1
10 TABLET ORAL DAILY
Status: DISCONTINUED | OUTPATIENT
Start: 2018-05-17 | End: 2018-05-16

## 2018-05-16 RX ORDER — ACETAMINOPHEN 500 MG
1000 TABLET ORAL ONCE
Status: COMPLETED | OUTPATIENT
Start: 2018-05-16 | End: 2018-05-16

## 2018-05-16 RX ORDER — DOXEPIN HYDROCHLORIDE 50 MG/1
50 CAPSULE ORAL 4 TIMES DAILY
Status: DISCONTINUED | OUTPATIENT
Start: 2018-05-16 | End: 2018-05-16

## 2018-05-16 RX ORDER — SODIUM CHLORIDE 0.9 % (FLUSH) 0.9 %
10 SYRINGE (ML) INJECTION PRN
Status: DISCONTINUED | OUTPATIENT
Start: 2018-05-16 | End: 2018-05-16 | Stop reason: SDUPTHER

## 2018-05-16 RX ORDER — LEVOTHYROXINE SODIUM 0.05 MG/1
50 TABLET ORAL DAILY
Status: DISCONTINUED | OUTPATIENT
Start: 2018-05-17 | End: 2018-05-21 | Stop reason: HOSPADM

## 2018-05-16 RX ADMIN — SODIUM CHLORIDE 1000 ML: 9 INJECTION, SOLUTION INTRAVENOUS at 11:36

## 2018-05-16 RX ADMIN — VANCOMYCIN HYDROCHLORIDE 1000 MG: 1 INJECTION, POWDER, LYOPHILIZED, FOR SOLUTION INTRAVENOUS at 14:49

## 2018-05-16 RX ADMIN — SODIUM CHLORIDE: 9 INJECTION, SOLUTION INTRAVENOUS at 16:27

## 2018-05-16 RX ADMIN — ACETAMINOPHEN 1000 MG: 500 TABLET ORAL at 13:25

## 2018-05-16 RX ADMIN — KETOROLAC TROMETHAMINE 15 MG: 30 INJECTION, SOLUTION INTRAMUSCULAR; INTRAVENOUS at 14:56

## 2018-05-16 RX ADMIN — CEFEPIME HYDROCHLORIDE 2 G: 2 INJECTION, POWDER, FOR SOLUTION INTRAVENOUS at 13:28

## 2018-05-16 RX ADMIN — NOREPINEPHRINE BITARTRATE 2 MCG/MIN: 1 INJECTION INTRAVENOUS at 16:55

## 2018-05-16 RX ADMIN — SODIUM CHLORIDE 1000 ML: 9 INJECTION, SOLUTION INTRAVENOUS at 16:00

## 2018-05-16 RX ADMIN — SODIUM CHLORIDE 1000 ML: 9 INJECTION, SOLUTION INTRAVENOUS at 13:36

## 2018-05-16 ASSESSMENT — PAIN SCALES - GENERAL
PAINLEVEL_OUTOF10: 0

## 2018-05-16 ASSESSMENT — ENCOUNTER SYMPTOMS
DIARRHEA: 0
WHEEZING: 0
BACK PAIN: 1
DIFFICULTY BREATHING: 0
SHORTNESS OF BREATH: 0
COUGH: 0
BLOOD IN STOOL: 0
CONSTIPATION: 0
VOMITING: 0
NAUSEA: 0
ABDOMINAL PAIN: 1

## 2018-05-17 ENCOUNTER — APPOINTMENT (OUTPATIENT)
Dept: GENERAL RADIOLOGY | Age: 78
DRG: 299 | End: 2018-05-17
Payer: MEDICARE

## 2018-05-17 ENCOUNTER — APPOINTMENT (OUTPATIENT)
Dept: ULTRASOUND IMAGING | Age: 78
DRG: 299 | End: 2018-05-17
Payer: MEDICARE

## 2018-05-17 PROBLEM — E43 SEVERE PROTEIN-CALORIE MALNUTRITION (HCC): Status: ACTIVE | Noted: 2018-05-17

## 2018-05-17 LAB
ABO/RH: NORMAL
ANION GAP SERPL CALCULATED.3IONS-SCNC: 13 MMOL/L (ref 7–16)
ANTIBODY SCREEN: NORMAL
APTT: 27.5 SEC (ref 24.5–35.1)
APTT: 60.2 SEC (ref 24.5–35.1)
BASOPHILS ABSOLUTE: 0.03 E9/L (ref 0–0.2)
BASOPHILS RELATIVE PERCENT: 0.3 % (ref 0–2)
BUN BLDV-MCNC: 13 MG/DL (ref 8–23)
C-REACTIVE PROTEIN: 29 MG/DL (ref 0–0.4)
CALCIUM SERPL-MCNC: 8.5 MG/DL (ref 8.6–10.2)
CHLORIDE BLD-SCNC: 107 MMOL/L (ref 98–107)
CO2: 23 MMOL/L (ref 22–29)
CREAT SERPL-MCNC: 0.6 MG/DL (ref 0.5–1)
EOSINOPHILS ABSOLUTE: 0.24 E9/L (ref 0.05–0.5)
EOSINOPHILS RELATIVE PERCENT: 2.4 % (ref 0–6)
FERRITIN: 664 NG/ML
GFR AFRICAN AMERICAN: >60
GFR NON-AFRICAN AMERICAN: >60 ML/MIN/1.73
GLUCOSE BLD-MCNC: 93 MG/DL (ref 74–109)
HCT VFR BLD CALC: 21.8 % (ref 34–48)
HCT VFR BLD CALC: 22.2 % (ref 34–48)
HCT VFR BLD CALC: 22.4 % (ref 34–48)
HCT VFR BLD CALC: 24.4 % (ref 34–48)
HCT VFR BLD CALC: 25.1 % (ref 34–48)
HEMOGLOBIN: 6.6 G/DL (ref 11.5–15.5)
HEMOGLOBIN: 6.7 G/DL (ref 11.5–15.5)
HEMOGLOBIN: 6.9 G/DL (ref 11.5–15.5)
HEMOGLOBIN: 7.8 G/DL (ref 11.5–15.5)
HEMOGLOBIN: 7.9 G/DL (ref 11.5–15.5)
IMMATURE GRANULOCYTES #: 0.06 E9/L
IMMATURE GRANULOCYTES %: 0.6 % (ref 0–5)
IMMATURE RETIC FRACT: 12.9 % (ref 3–15.9)
INR BLD: 1.5
LYMPHOCYTES ABSOLUTE: 1.24 E9/L (ref 1.5–4)
LYMPHOCYTES RELATIVE PERCENT: 12.4 % (ref 20–42)
MCH RBC QN AUTO: 27 PG (ref 26–35)
MCH RBC QN AUTO: 27 PG (ref 26–35)
MCH RBC QN AUTO: 27.4 PG (ref 26–35)
MCH RBC QN AUTO: 27.5 PG (ref 26–35)
MCHC RBC AUTO-ENTMCNC: 30.2 % (ref 32–34.5)
MCHC RBC AUTO-ENTMCNC: 30.3 % (ref 32–34.5)
MCHC RBC AUTO-ENTMCNC: 30.8 % (ref 32–34.5)
MCHC RBC AUTO-ENTMCNC: 31.5 % (ref 32–34.5)
MCV RBC AUTO: 87.2 FL (ref 80–99.9)
MCV RBC AUTO: 89.2 FL (ref 80–99.9)
MCV RBC AUTO: 89.3 FL (ref 80–99.9)
MCV RBC AUTO: 89.5 FL (ref 80–99.9)
MONOCYTES ABSOLUTE: 0.88 E9/L (ref 0.1–0.95)
MONOCYTES RELATIVE PERCENT: 8.8 % (ref 2–12)
NEUTROPHILS ABSOLUTE: 7.53 E9/L (ref 1.8–7.3)
NEUTROPHILS RELATIVE PERCENT: 75.5 % (ref 43–80)
PATHOLOGIST REVIEW: NORMAL
PDW BLD-RTO: 15.2 FL (ref 11.5–15)
PDW BLD-RTO: 15.3 FL (ref 11.5–15)
PLATELET # BLD: 376 E9/L (ref 130–450)
PLATELET # BLD: 392 E9/L (ref 130–450)
PLATELET # BLD: 392 E9/L (ref 130–450)
PLATELET # BLD: 407 E9/L (ref 130–450)
PMV BLD AUTO: 9 FL (ref 7–12)
PMV BLD AUTO: 9.2 FL (ref 7–12)
POTASSIUM SERPL-SCNC: 3.2 MMOL/L (ref 3.5–5)
PROCALCITONIN: 0.18 NG/ML (ref 0–0.08)
PROTHROMBIN TIME: 17.1 SEC (ref 9.3–12.4)
RBC # BLD: 2.44 E12/L (ref 3.5–5.5)
RBC # BLD: 2.48 E12/L (ref 3.5–5.5)
RBC # BLD: 2.51 E12/L (ref 3.5–5.5)
RBC # BLD: 2.88 E12/L (ref 3.5–5.5)
RETIC HGB EQUIVALENT: 21.6 PG (ref 28.2–36.6)
RETICULOCYTE ABSOLUTE COUNT: 0.03 E12/L
RETICULOCYTE COUNT PCT: 1.2 % (ref 0.4–1.9)
SEDIMENTATION RATE, ERYTHROCYTE: 105 MM/HR (ref 0–20)
SODIUM BLD-SCNC: 143 MMOL/L (ref 132–146)
WBC # BLD: 10 E9/L (ref 4.5–11.5)
WBC # BLD: 10.2 E9/L (ref 4.5–11.5)
WBC # BLD: 11.1 E9/L (ref 4.5–11.5)
WBC # BLD: 9.7 E9/L (ref 4.5–11.5)

## 2018-05-17 PROCEDURE — 82728 ASSAY OF FERRITIN: CPT

## 2018-05-17 PROCEDURE — 2580000003 HC RX 258: Performed by: EMERGENCY MEDICINE

## 2018-05-17 PROCEDURE — 2580000003 HC RX 258: Performed by: INTERNAL MEDICINE

## 2018-05-17 PROCEDURE — 36430 TRANSFUSION BLD/BLD COMPNT: CPT

## 2018-05-17 PROCEDURE — 6360000002 HC RX W HCPCS: Performed by: INTERNAL MEDICINE

## 2018-05-17 PROCEDURE — C9113 INJ PANTOPRAZOLE SODIUM, VIA: HCPCS | Performed by: INTERNAL MEDICINE

## 2018-05-17 PROCEDURE — 85027 COMPLETE CBC AUTOMATED: CPT

## 2018-05-17 PROCEDURE — 86140 C-REACTIVE PROTEIN: CPT

## 2018-05-17 PROCEDURE — 86901 BLOOD TYPING SEROLOGIC RH(D): CPT

## 2018-05-17 PROCEDURE — 36592 COLLECT BLOOD FROM PICC: CPT

## 2018-05-17 PROCEDURE — 80048 BASIC METABOLIC PNL TOTAL CA: CPT

## 2018-05-17 PROCEDURE — 85730 THROMBOPLASTIN TIME PARTIAL: CPT

## 2018-05-17 PROCEDURE — 85018 HEMOGLOBIN: CPT

## 2018-05-17 PROCEDURE — 86900 BLOOD TYPING SEROLOGIC ABO: CPT

## 2018-05-17 PROCEDURE — 6370000000 HC RX 637 (ALT 250 FOR IP): Performed by: FAMILY MEDICINE

## 2018-05-17 PROCEDURE — 85651 RBC SED RATE NONAUTOMATED: CPT

## 2018-05-17 PROCEDURE — 93971 EXTREMITY STUDY: CPT

## 2018-05-17 PROCEDURE — 99233 SBSQ HOSP IP/OBS HIGH 50: CPT | Performed by: INTERNAL MEDICINE

## 2018-05-17 PROCEDURE — 86923 COMPATIBILITY TEST ELECTRIC: CPT

## 2018-05-17 PROCEDURE — 72070 X-RAY EXAM THORAC SPINE 2VWS: CPT

## 2018-05-17 PROCEDURE — 2000000000 HC ICU R&B

## 2018-05-17 PROCEDURE — 84145 PROCALCITONIN (PCT): CPT

## 2018-05-17 PROCEDURE — 85610 PROTHROMBIN TIME: CPT

## 2018-05-17 PROCEDURE — 86850 RBC ANTIBODY SCREEN: CPT

## 2018-05-17 PROCEDURE — 85014 HEMATOCRIT: CPT

## 2018-05-17 PROCEDURE — 2580000003 HC RX 258: Performed by: FAMILY MEDICINE

## 2018-05-17 PROCEDURE — 36415 COLL VENOUS BLD VENIPUNCTURE: CPT

## 2018-05-17 PROCEDURE — 85025 COMPLETE CBC W/AUTO DIFF WBC: CPT

## 2018-05-17 PROCEDURE — P9016 RBC LEUKOCYTES REDUCED: HCPCS

## 2018-05-17 PROCEDURE — 85045 AUTOMATED RETICULOCYTE COUNT: CPT

## 2018-05-17 PROCEDURE — 72100 X-RAY EXAM L-S SPINE 2/3 VWS: CPT

## 2018-05-17 PROCEDURE — 2500000003 HC RX 250 WO HCPCS: Performed by: INTERNAL MEDICINE

## 2018-05-17 RX ORDER — HEPARIN SODIUM 1000 [USP'U]/ML
40 INJECTION, SOLUTION INTRAVENOUS; SUBCUTANEOUS PRN
Status: CANCELLED | OUTPATIENT
Start: 2018-05-17

## 2018-05-17 RX ORDER — HEPARIN SODIUM 10000 [USP'U]/100ML
18 INJECTION, SOLUTION INTRAVENOUS CONTINUOUS
Status: DISCONTINUED | OUTPATIENT
Start: 2018-05-17 | End: 2018-05-21

## 2018-05-17 RX ORDER — HEPARIN SODIUM 1000 [USP'U]/ML
40 INJECTION, SOLUTION INTRAVENOUS; SUBCUTANEOUS PRN
Status: DISCONTINUED | OUTPATIENT
Start: 2018-05-17 | End: 2018-05-21 | Stop reason: ALTCHOICE

## 2018-05-17 RX ORDER — HEPARIN SODIUM 1000 [USP'U]/ML
80 INJECTION, SOLUTION INTRAVENOUS; SUBCUTANEOUS PRN
Status: CANCELLED | OUTPATIENT
Start: 2018-05-17

## 2018-05-17 RX ORDER — HEPARIN SODIUM 10000 [USP'U]/100ML
18 INJECTION, SOLUTION INTRAVENOUS CONTINUOUS
Status: CANCELLED | OUTPATIENT
Start: 2018-05-17

## 2018-05-17 RX ORDER — HEPARIN SODIUM 1000 [USP'U]/ML
80 INJECTION, SOLUTION INTRAVENOUS; SUBCUTANEOUS PRN
Status: DISCONTINUED | OUTPATIENT
Start: 2018-05-17 | End: 2018-05-21 | Stop reason: ALTCHOICE

## 2018-05-17 RX ORDER — 0.9 % SODIUM CHLORIDE 0.9 %
250 INTRAVENOUS SOLUTION INTRAVENOUS ONCE
Status: DISCONTINUED | OUTPATIENT
Start: 2018-05-17 | End: 2018-05-20

## 2018-05-17 RX ORDER — HEPARIN SODIUM 1000 [USP'U]/ML
80 INJECTION, SOLUTION INTRAVENOUS; SUBCUTANEOUS ONCE
Status: COMPLETED | OUTPATIENT
Start: 2018-05-17 | End: 2018-05-17

## 2018-05-17 RX ADMIN — ACETAMINOPHEN 650 MG: 325 TABLET, FILM COATED ORAL at 15:13

## 2018-05-17 RX ADMIN — Medication 10 ML: at 00:53

## 2018-05-17 RX ADMIN — Medication 10 ML: at 00:52

## 2018-05-17 RX ADMIN — BRIMONIDINE TARTRATE 1 DROP: 2 SOLUTION/ DROPS OPHTHALMIC at 00:48

## 2018-05-17 RX ADMIN — SODIUM CHLORIDE: 9 INJECTION, SOLUTION INTRAVENOUS at 11:49

## 2018-05-17 RX ADMIN — TIMOLOL MALEATE 1 DROP: 5 SOLUTION OPHTHALMIC at 08:50

## 2018-05-17 RX ADMIN — PANTOPRAZOLE SODIUM 40 MG: 40 INJECTION, POWDER, FOR SOLUTION INTRAVENOUS at 08:49

## 2018-05-17 RX ADMIN — LEVOTHYROXINE SODIUM 50 MCG: 50 TABLET ORAL at 08:49

## 2018-05-17 RX ADMIN — TIMOLOL MALEATE 1 DROP: 5 SOLUTION OPHTHALMIC at 00:48

## 2018-05-17 RX ADMIN — HEPARIN SODIUM 4780 UNITS: 1000 INJECTION, SOLUTION INTRAVENOUS; SUBCUTANEOUS at 16:47

## 2018-05-17 RX ADMIN — CEFEPIME HYDROCHLORIDE 2 G: 2 INJECTION, POWDER, FOR SOLUTION INTRAVENOUS at 00:47

## 2018-05-17 RX ADMIN — POTASSIUM PHOSPHATE, MONOBASIC AND POTASSIUM PHOSPHATE, DIBASIC 30 MMOL: 224; 236 INJECTION, SOLUTION INTRAVENOUS at 10:16

## 2018-05-17 RX ADMIN — LATANOPROST 1 DROP: 50 SOLUTION/ DROPS OPHTHALMIC at 00:47

## 2018-05-17 RX ADMIN — BRIMONIDINE TARTRATE 1 DROP: 2 SOLUTION/ DROPS OPHTHALMIC at 08:49

## 2018-05-17 RX ADMIN — LATANOPROST 1 DROP: 50 SOLUTION/ DROPS OPHTHALMIC at 20:02

## 2018-05-17 RX ADMIN — Medication 1000 MG: at 08:49

## 2018-05-17 RX ADMIN — SODIUM CHLORIDE: 9 INJECTION, SOLUTION INTRAVENOUS at 02:13

## 2018-05-17 RX ADMIN — Medication 10 ML: at 20:02

## 2018-05-17 RX ADMIN — Medication 10 ML: at 08:49

## 2018-05-17 RX ADMIN — HEPARIN SODIUM 18 UNITS/KG/HR: 10000 INJECTION, SOLUTION INTRAVENOUS at 16:47

## 2018-05-17 RX ADMIN — VANCOMYCIN HYDROCHLORIDE 1000 MG: 1 INJECTION, POWDER, LYOPHILIZED, FOR SOLUTION INTRAVENOUS at 11:25

## 2018-05-17 RX ADMIN — TIMOLOL MALEATE 1 DROP: 5 SOLUTION OPHTHALMIC at 20:02

## 2018-05-17 RX ADMIN — SODIUM CHLORIDE: 9 INJECTION, SOLUTION INTRAVENOUS at 20:13

## 2018-05-17 RX ADMIN — BRIMONIDINE TARTRATE 1 DROP: 2 SOLUTION/ DROPS OPHTHALMIC at 20:02

## 2018-05-17 ASSESSMENT — PAIN DESCRIPTION - PAIN TYPE: TYPE: ACUTE PAIN

## 2018-05-17 ASSESSMENT — PAIN DESCRIPTION - ONSET: ONSET: ON-GOING

## 2018-05-17 ASSESSMENT — PAIN SCALES - GENERAL
PAINLEVEL_OUTOF10: 0
PAINLEVEL_OUTOF10: 0
PAINLEVEL_OUTOF10: 6
PAINLEVEL_OUTOF10: 0

## 2018-05-17 ASSESSMENT — PAIN DESCRIPTION - PROGRESSION: CLINICAL_PROGRESSION: GRADUALLY WORSENING

## 2018-05-17 ASSESSMENT — PAIN DESCRIPTION - DESCRIPTORS: DESCRIPTORS: HEADACHE;THROBBING

## 2018-05-17 ASSESSMENT — PAIN DESCRIPTION - LOCATION: LOCATION: HEAD

## 2018-05-17 ASSESSMENT — PAIN DESCRIPTION - ORIENTATION: ORIENTATION: POSTERIOR;LOWER

## 2018-05-17 ASSESSMENT — PAIN DESCRIPTION - FREQUENCY: FREQUENCY: CONTINUOUS

## 2018-05-18 ENCOUNTER — APPOINTMENT (OUTPATIENT)
Dept: CT IMAGING | Age: 78
DRG: 299 | End: 2018-05-18
Payer: MEDICARE

## 2018-05-18 ENCOUNTER — APPOINTMENT (OUTPATIENT)
Dept: GENERAL RADIOLOGY | Age: 78
DRG: 299 | End: 2018-05-18
Payer: MEDICARE

## 2018-05-18 LAB
ANION GAP SERPL CALCULATED.3IONS-SCNC: 15 MMOL/L (ref 7–16)
APTT: 50.5 SEC (ref 24.5–35.1)
BUN BLDV-MCNC: 6 MG/DL (ref 8–23)
CALCIUM SERPL-MCNC: 8.1 MG/DL (ref 8.6–10.2)
CHLORIDE BLD-SCNC: 101 MMOL/L (ref 98–107)
CO2: 23 MMOL/L (ref 22–29)
CREAT SERPL-MCNC: 0.5 MG/DL (ref 0.5–1)
GFR AFRICAN AMERICAN: >60
GFR NON-AFRICAN AMERICAN: >60 ML/MIN/1.73
GLUCOSE BLD-MCNC: 104 MG/DL (ref 74–109)
HCT VFR BLD CALC: 25.3 % (ref 34–48)
HCT VFR BLD CALC: 26.7 % (ref 34–48)
HEMOGLOBIN: 8.2 G/DL (ref 11.5–15.5)
HEMOGLOBIN: 8.6 G/DL (ref 11.5–15.5)
IRON SATURATION: 14 % (ref 15–50)
IRON: 20 MCG/DL (ref 37–145)
LV EF: 65 %
LVEF MODALITY: NORMAL
MCH RBC QN AUTO: 27.7 PG (ref 26–35)
MCHC RBC AUTO-ENTMCNC: 32.4 % (ref 32–34.5)
MCV RBC AUTO: 85.5 FL (ref 80–99.9)
PDW BLD-RTO: 15.6 FL (ref 11.5–15)
PLATELET # BLD: 497 E9/L (ref 130–450)
PMV BLD AUTO: 9 FL (ref 7–12)
POTASSIUM SERPL-SCNC: 2.9 MMOL/L (ref 3.5–5)
RBC # BLD: 2.96 E12/L (ref 3.5–5.5)
SODIUM BLD-SCNC: 139 MMOL/L (ref 132–146)
TOTAL IRON BINDING CAPACITY: 138 MCG/DL (ref 250–450)
WBC # BLD: 10.8 E9/L (ref 4.5–11.5)

## 2018-05-18 PROCEDURE — 6360000002 HC RX W HCPCS: Performed by: INTERNAL MEDICINE

## 2018-05-18 PROCEDURE — 97166 OT EVAL MOD COMPLEX 45 MIN: CPT

## 2018-05-18 PROCEDURE — 6360000004 HC RX CONTRAST MEDICATION: Performed by: RADIOLOGY

## 2018-05-18 PROCEDURE — 94664 DEMO&/EVAL PT USE INHALER: CPT

## 2018-05-18 PROCEDURE — G8979 MOBILITY GOAL STATUS: HCPCS

## 2018-05-18 PROCEDURE — 72129 CT CHEST SPINE W/DYE: CPT

## 2018-05-18 PROCEDURE — 97162 PT EVAL MOD COMPLEX 30 MIN: CPT

## 2018-05-18 PROCEDURE — 2580000003 HC RX 258: Performed by: FAMILY MEDICINE

## 2018-05-18 PROCEDURE — 97530 THERAPEUTIC ACTIVITIES: CPT

## 2018-05-18 PROCEDURE — 6370000000 HC RX 637 (ALT 250 FOR IP): Performed by: FAMILY MEDICINE

## 2018-05-18 PROCEDURE — 6360000002 HC RX W HCPCS: Performed by: STUDENT IN AN ORGANIZED HEALTH CARE EDUCATION/TRAINING PROGRAM

## 2018-05-18 PROCEDURE — 36592 COLLECT BLOOD FROM PICC: CPT

## 2018-05-18 PROCEDURE — 99233 SBSQ HOSP IP/OBS HIGH 50: CPT | Performed by: INTERNAL MEDICINE

## 2018-05-18 PROCEDURE — 93306 TTE W/DOPPLER COMPLETE: CPT

## 2018-05-18 PROCEDURE — 6370000000 HC RX 637 (ALT 250 FOR IP): Performed by: INTERNAL MEDICINE

## 2018-05-18 PROCEDURE — G8987 SELF CARE CURRENT STATUS: HCPCS

## 2018-05-18 PROCEDURE — G8978 MOBILITY CURRENT STATUS: HCPCS

## 2018-05-18 PROCEDURE — 97110 THERAPEUTIC EXERCISES: CPT

## 2018-05-18 PROCEDURE — 71045 X-RAY EXAM CHEST 1 VIEW: CPT

## 2018-05-18 PROCEDURE — 83540 ASSAY OF IRON: CPT

## 2018-05-18 PROCEDURE — 2580000003 HC RX 258: Performed by: EMERGENCY MEDICINE

## 2018-05-18 PROCEDURE — 80048 BASIC METABOLIC PNL TOTAL CA: CPT

## 2018-05-18 PROCEDURE — 85730 THROMBOPLASTIN TIME PARTIAL: CPT

## 2018-05-18 PROCEDURE — C9113 INJ PANTOPRAZOLE SODIUM, VIA: HCPCS | Performed by: INTERNAL MEDICINE

## 2018-05-18 PROCEDURE — 85014 HEMATOCRIT: CPT

## 2018-05-18 PROCEDURE — 85027 COMPLETE CBC AUTOMATED: CPT

## 2018-05-18 PROCEDURE — G8988 SELF CARE GOAL STATUS: HCPCS

## 2018-05-18 PROCEDURE — 2580000003 HC RX 258: Performed by: INTERNAL MEDICINE

## 2018-05-18 PROCEDURE — 97535 SELF CARE MNGMENT TRAINING: CPT

## 2018-05-18 PROCEDURE — 36415 COLL VENOUS BLD VENIPUNCTURE: CPT

## 2018-05-18 PROCEDURE — 83550 IRON BINDING TEST: CPT

## 2018-05-18 PROCEDURE — 72132 CT LUMBAR SPINE W/DYE: CPT

## 2018-05-18 PROCEDURE — 2000000000 HC ICU R&B

## 2018-05-18 PROCEDURE — 85018 HEMOGLOBIN: CPT

## 2018-05-18 RX ORDER — POTASSIUM CHLORIDE 7.45 MG/ML
10 INJECTION INTRAVENOUS
Status: DISCONTINUED | OUTPATIENT
Start: 2018-05-18 | End: 2018-05-18

## 2018-05-18 RX ORDER — POTASSIUM CHLORIDE 20 MEQ/1
40 TABLET, EXTENDED RELEASE ORAL ONCE
Status: COMPLETED | OUTPATIENT
Start: 2018-05-18 | End: 2018-05-18

## 2018-05-18 RX ORDER — WARFARIN SODIUM 5 MG/1
5 TABLET ORAL DAILY
Status: DISCONTINUED | OUTPATIENT
Start: 2018-05-18 | End: 2018-05-20

## 2018-05-18 RX ORDER — IPRATROPIUM BROMIDE AND ALBUTEROL SULFATE 2.5; .5 MG/3ML; MG/3ML
1 SOLUTION RESPIRATORY (INHALATION) 4 TIMES DAILY
Status: DISCONTINUED | OUTPATIENT
Start: 2018-05-18 | End: 2018-05-21

## 2018-05-18 RX ORDER — POTASSIUM CHLORIDE 29.8 MG/ML
20 INJECTION INTRAVENOUS
Status: COMPLETED | OUTPATIENT
Start: 2018-05-18 | End: 2018-05-18

## 2018-05-18 RX ADMIN — SODIUM CHLORIDE: 9 INJECTION, SOLUTION INTRAVENOUS at 17:14

## 2018-05-18 RX ADMIN — LATANOPROST 1 DROP: 50 SOLUTION/ DROPS OPHTHALMIC at 20:04

## 2018-05-18 RX ADMIN — IOPAMIDOL 90 ML: 755 INJECTION, SOLUTION INTRAVENOUS at 20:17

## 2018-05-18 RX ADMIN — SODIUM CHLORIDE: 9 INJECTION, SOLUTION INTRAVENOUS at 05:53

## 2018-05-18 RX ADMIN — TIMOLOL MALEATE 1 DROP: 5 SOLUTION OPHTHALMIC at 07:41

## 2018-05-18 RX ADMIN — BRIMONIDINE TARTRATE 1 DROP: 2 SOLUTION/ DROPS OPHTHALMIC at 07:41

## 2018-05-18 RX ADMIN — POTASSIUM CHLORIDE 40 MEQ: 20 TABLET, EXTENDED RELEASE ORAL at 12:01

## 2018-05-18 RX ADMIN — PANTOPRAZOLE SODIUM 40 MG: 40 INJECTION, POWDER, FOR SOLUTION INTRAVENOUS at 07:40

## 2018-05-18 RX ADMIN — IPRATROPIUM BROMIDE AND ALBUTEROL SULFATE 1 AMPULE: 2.5; .5 SOLUTION RESPIRATORY (INHALATION) at 19:49

## 2018-05-18 RX ADMIN — Medication 10 ML: at 07:41

## 2018-05-18 RX ADMIN — POTASSIUM CHLORIDE 20 MEQ: 400 INJECTION, SOLUTION INTRAVENOUS at 09:48

## 2018-05-18 RX ADMIN — Medication 10 ML: at 20:05

## 2018-05-18 RX ADMIN — CEFEPIME HYDROCHLORIDE 2 G: 2 INJECTION, POWDER, FOR SOLUTION INTRAVENOUS at 22:42

## 2018-05-18 RX ADMIN — OXYCODONE HYDROCHLORIDE AND ACETAMINOPHEN 2 TABLET: 5; 325 TABLET ORAL at 05:49

## 2018-05-18 RX ADMIN — OXYCODONE HYDROCHLORIDE AND ACETAMINOPHEN 2 TABLET: 5; 325 TABLET ORAL at 22:41

## 2018-05-18 RX ADMIN — POTASSIUM CHLORIDE 20 MEQ: 400 INJECTION, SOLUTION INTRAVENOUS at 07:32

## 2018-05-18 RX ADMIN — HEPARIN SODIUM 18 UNITS/KG/HR: 10000 INJECTION, SOLUTION INTRAVENOUS at 15:46

## 2018-05-18 RX ADMIN — CEFEPIME HYDROCHLORIDE 2 G: 2 INJECTION, POWDER, FOR SOLUTION INTRAVENOUS at 00:05

## 2018-05-18 RX ADMIN — POTASSIUM CHLORIDE 20 MEQ: 400 INJECTION, SOLUTION INTRAVENOUS at 06:54

## 2018-05-18 RX ADMIN — WARFARIN SODIUM 5 MG: 5 TABLET ORAL at 17:21

## 2018-05-18 RX ADMIN — BRIMONIDINE TARTRATE 1 DROP: 2 SOLUTION/ DROPS OPHTHALMIC at 20:04

## 2018-05-18 RX ADMIN — TIMOLOL MALEATE 1 DROP: 5 SOLUTION OPHTHALMIC at 20:04

## 2018-05-18 RX ADMIN — Medication 1000 MG: at 07:41

## 2018-05-18 RX ADMIN — LEVOTHYROXINE SODIUM 50 MCG: 50 TABLET ORAL at 06:54

## 2018-05-18 RX ADMIN — POTASSIUM CHLORIDE 20 MEQ: 400 INJECTION, SOLUTION INTRAVENOUS at 07:41

## 2018-05-18 ASSESSMENT — PAIN SCALES - GENERAL
PAINLEVEL_OUTOF10: 0
PAINLEVEL_OUTOF10: 0
PAINLEVEL_OUTOF10: 7
PAINLEVEL_OUTOF10: 0
PAINLEVEL_OUTOF10: 6
PAINLEVEL_OUTOF10: 0
PAINLEVEL_OUTOF10: 0

## 2018-05-18 ASSESSMENT — PAIN DESCRIPTION - PAIN TYPE
TYPE: ACUTE PAIN
TYPE: ACUTE PAIN

## 2018-05-18 ASSESSMENT — PAIN DESCRIPTION - LOCATION
LOCATION: HEAD;NECK
LOCATION: HEAD;BACK

## 2018-05-18 ASSESSMENT — PAIN DESCRIPTION - PROGRESSION
CLINICAL_PROGRESSION: GRADUALLY WORSENING
CLINICAL_PROGRESSION: GRADUALLY WORSENING

## 2018-05-18 ASSESSMENT — PAIN DESCRIPTION - DESCRIPTORS
DESCRIPTORS: ACHING;DISCOMFORT;HEADACHE
DESCRIPTORS: ACHING;DISCOMFORT;HEADACHE

## 2018-05-18 ASSESSMENT — PAIN DESCRIPTION - ONSET
ONSET: AWAKENED FROM SLEEP
ONSET: AWAKENED FROM SLEEP

## 2018-05-18 ASSESSMENT — PAIN DESCRIPTION - ORIENTATION: ORIENTATION: MID

## 2018-05-18 ASSESSMENT — PAIN DESCRIPTION - FREQUENCY
FREQUENCY: CONTINUOUS
FREQUENCY: CONTINUOUS

## 2018-05-19 LAB
ANION GAP SERPL CALCULATED.3IONS-SCNC: 13 MMOL/L (ref 7–16)
APTT: 70.8 SEC (ref 24.5–35.1)
BUN BLDV-MCNC: 4 MG/DL (ref 8–23)
CALCIUM SERPL-MCNC: 8.5 MG/DL (ref 8.6–10.2)
CHLORIDE BLD-SCNC: 106 MMOL/L (ref 98–107)
CO2: 24 MMOL/L (ref 22–29)
CREAT SERPL-MCNC: 0.5 MG/DL (ref 0.5–1)
GFR AFRICAN AMERICAN: >60
GFR NON-AFRICAN AMERICAN: >60 ML/MIN/1.73
GLUCOSE BLD-MCNC: 101 MG/DL (ref 74–109)
HCT VFR BLD CALC: 25.3 % (ref 34–48)
HEMOGLOBIN: 8 G/DL (ref 11.5–15.5)
INR BLD: 1.7
MCH RBC QN AUTO: 27.2 PG (ref 26–35)
MCHC RBC AUTO-ENTMCNC: 31.6 % (ref 32–34.5)
MCV RBC AUTO: 86.1 FL (ref 80–99.9)
PDW BLD-RTO: 15.5 FL (ref 11.5–15)
PLATELET # BLD: 577 E9/L (ref 130–450)
PMV BLD AUTO: 9 FL (ref 7–12)
POTASSIUM SERPL-SCNC: 3.7 MMOL/L (ref 3.5–5)
PROTHROMBIN TIME: 19.4 SEC (ref 9.3–12.4)
RBC # BLD: 2.94 E12/L (ref 3.5–5.5)
SODIUM BLD-SCNC: 143 MMOL/L (ref 132–146)
WBC # BLD: 11.2 E9/L (ref 4.5–11.5)

## 2018-05-19 PROCEDURE — 85027 COMPLETE CBC AUTOMATED: CPT

## 2018-05-19 PROCEDURE — 2000000000 HC ICU R&B

## 2018-05-19 PROCEDURE — 80048 BASIC METABOLIC PNL TOTAL CA: CPT

## 2018-05-19 PROCEDURE — 6370000000 HC RX 637 (ALT 250 FOR IP): Performed by: FAMILY MEDICINE

## 2018-05-19 PROCEDURE — 36415 COLL VENOUS BLD VENIPUNCTURE: CPT

## 2018-05-19 PROCEDURE — 85610 PROTHROMBIN TIME: CPT

## 2018-05-19 PROCEDURE — 85730 THROMBOPLASTIN TIME PARTIAL: CPT

## 2018-05-19 PROCEDURE — 2580000003 HC RX 258: Performed by: INTERNAL MEDICINE

## 2018-05-19 PROCEDURE — 2580000003 HC RX 258: Performed by: FAMILY MEDICINE

## 2018-05-19 PROCEDURE — 6360000002 HC RX W HCPCS: Performed by: INTERNAL MEDICINE

## 2018-05-19 PROCEDURE — 99233 SBSQ HOSP IP/OBS HIGH 50: CPT | Performed by: INTERNAL MEDICINE

## 2018-05-19 PROCEDURE — C9113 INJ PANTOPRAZOLE SODIUM, VIA: HCPCS | Performed by: INTERNAL MEDICINE

## 2018-05-19 PROCEDURE — 2580000003 HC RX 258: Performed by: EMERGENCY MEDICINE

## 2018-05-19 PROCEDURE — 6370000000 HC RX 637 (ALT 250 FOR IP): Performed by: INTERNAL MEDICINE

## 2018-05-19 PROCEDURE — 94640 AIRWAY INHALATION TREATMENT: CPT

## 2018-05-19 RX ADMIN — IPRATROPIUM BROMIDE AND ALBUTEROL SULFATE 1 AMPULE: 2.5; .5 SOLUTION RESPIRATORY (INHALATION) at 20:20

## 2018-05-19 RX ADMIN — SODIUM CHLORIDE: 9 INJECTION, SOLUTION INTRAVENOUS at 15:34

## 2018-05-19 RX ADMIN — WARFARIN SODIUM 5 MG: 5 TABLET ORAL at 17:49

## 2018-05-19 RX ADMIN — ACETAMINOPHEN 650 MG: 325 TABLET, FILM COATED ORAL at 01:44

## 2018-05-19 RX ADMIN — Medication 10 ML: at 15:41

## 2018-05-19 RX ADMIN — IPRATROPIUM BROMIDE AND ALBUTEROL SULFATE 1 AMPULE: 2.5; .5 SOLUTION RESPIRATORY (INHALATION) at 12:10

## 2018-05-19 RX ADMIN — OXYCODONE HYDROCHLORIDE AND ACETAMINOPHEN 2 TABLET: 5; 325 TABLET ORAL at 09:01

## 2018-05-19 RX ADMIN — IPRATROPIUM BROMIDE AND ALBUTEROL SULFATE 1 AMPULE: 2.5; .5 SOLUTION RESPIRATORY (INHALATION) at 15:42

## 2018-05-19 RX ADMIN — IPRATROPIUM BROMIDE AND ALBUTEROL SULFATE 1 AMPULE: 2.5; .5 SOLUTION RESPIRATORY (INHALATION) at 08:15

## 2018-05-19 RX ADMIN — SODIUM CHLORIDE: 9 INJECTION, SOLUTION INTRAVENOUS at 06:02

## 2018-05-19 RX ADMIN — Medication 10 ML: at 20:44

## 2018-05-19 RX ADMIN — TIMOLOL MALEATE 1 DROP: 5 SOLUTION OPHTHALMIC at 20:44

## 2018-05-19 RX ADMIN — Medication 1000 MG: at 09:01

## 2018-05-19 RX ADMIN — LEVOTHYROXINE SODIUM 50 MCG: 50 TABLET ORAL at 06:02

## 2018-05-19 RX ADMIN — BRIMONIDINE TARTRATE 1 DROP: 2 SOLUTION/ DROPS OPHTHALMIC at 20:43

## 2018-05-19 RX ADMIN — CEFEPIME HYDROCHLORIDE 2 G: 2 INJECTION, POWDER, FOR SOLUTION INTRAVENOUS at 11:58

## 2018-05-19 RX ADMIN — LATANOPROST 1 DROP: 50 SOLUTION/ DROPS OPHTHALMIC at 20:44

## 2018-05-19 RX ADMIN — TIMOLOL MALEATE 1 DROP: 5 SOLUTION OPHTHALMIC at 09:36

## 2018-05-19 RX ADMIN — BRIMONIDINE TARTRATE 1 DROP: 2 SOLUTION/ DROPS OPHTHALMIC at 09:24

## 2018-05-19 RX ADMIN — HEPARIN SODIUM 18 UNITS/KG/HR: 10000 INJECTION, SOLUTION INTRAVENOUS at 15:35

## 2018-05-19 RX ADMIN — Medication 10 ML: at 09:01

## 2018-05-19 RX ADMIN — PANTOPRAZOLE SODIUM 40 MG: 40 INJECTION, POWDER, FOR SOLUTION INTRAVENOUS at 09:01

## 2018-05-19 ASSESSMENT — PAIN SCALES - GENERAL
PAINLEVEL_OUTOF10: 0
PAINLEVEL_OUTOF10: 2
PAINLEVEL_OUTOF10: 0
PAINLEVEL_OUTOF10: 6

## 2018-05-19 ASSESSMENT — PAIN DESCRIPTION - LOCATION: LOCATION: HEAD;NECK

## 2018-05-19 ASSESSMENT — PAIN DESCRIPTION - PROGRESSION: CLINICAL_PROGRESSION: GRADUALLY IMPROVING

## 2018-05-19 ASSESSMENT — PAIN DESCRIPTION - PAIN TYPE: TYPE: CHRONIC PAIN

## 2018-05-20 LAB
ANION GAP SERPL CALCULATED.3IONS-SCNC: 12 MMOL/L (ref 7–16)
APTT: 66.7 SEC (ref 24.5–35.1)
BLOOD BANK DISPENSE STATUS: NORMAL
BLOOD BANK DISPENSE STATUS: NORMAL
BLOOD BANK PRODUCT CODE: NORMAL
BLOOD BANK PRODUCT CODE: NORMAL
BPU ID: NORMAL
BPU ID: NORMAL
BUN BLDV-MCNC: 5 MG/DL (ref 8–23)
CALCIUM SERPL-MCNC: 8.9 MG/DL (ref 8.6–10.2)
CHLORIDE BLD-SCNC: 99 MMOL/L (ref 98–107)
CO2: 28 MMOL/L (ref 22–29)
CREAT SERPL-MCNC: 0.5 MG/DL (ref 0.5–1)
DESCRIPTION BLOOD BANK: NORMAL
DESCRIPTION BLOOD BANK: NORMAL
GFR AFRICAN AMERICAN: >60
GFR NON-AFRICAN AMERICAN: >60 ML/MIN/1.73
GLUCOSE BLD-MCNC: 121 MG/DL (ref 74–109)
HCT VFR BLD CALC: 29.8 % (ref 34–48)
HEMOGLOBIN: 9.2 G/DL (ref 11.5–15.5)
INR BLD: 2.7
MCH RBC QN AUTO: 26.8 PG (ref 26–35)
MCHC RBC AUTO-ENTMCNC: 30.9 % (ref 32–34.5)
MCV RBC AUTO: 86.9 FL (ref 80–99.9)
PDW BLD-RTO: 15.4 FL (ref 11.5–15)
PLATELET # BLD: 706 E9/L (ref 130–450)
PMV BLD AUTO: 9 FL (ref 7–12)
POTASSIUM SERPL-SCNC: 4 MMOL/L (ref 3.5–5)
PROTHROMBIN TIME: 30.5 SEC (ref 9.3–12.4)
RBC # BLD: 3.43 E12/L (ref 3.5–5.5)
SODIUM BLD-SCNC: 139 MMOL/L (ref 132–146)
WBC # BLD: 11.2 E9/L (ref 4.5–11.5)

## 2018-05-20 PROCEDURE — 6360000002 HC RX W HCPCS: Performed by: INTERNAL MEDICINE

## 2018-05-20 PROCEDURE — C9113 INJ PANTOPRAZOLE SODIUM, VIA: HCPCS | Performed by: INTERNAL MEDICINE

## 2018-05-20 PROCEDURE — 94640 AIRWAY INHALATION TREATMENT: CPT

## 2018-05-20 PROCEDURE — 80048 BASIC METABOLIC PNL TOTAL CA: CPT

## 2018-05-20 PROCEDURE — 85027 COMPLETE CBC AUTOMATED: CPT

## 2018-05-20 PROCEDURE — 85730 THROMBOPLASTIN TIME PARTIAL: CPT

## 2018-05-20 PROCEDURE — 2500000003 HC RX 250 WO HCPCS: Performed by: HOSPITALIST

## 2018-05-20 PROCEDURE — 85610 PROTHROMBIN TIME: CPT

## 2018-05-20 PROCEDURE — 36415 COLL VENOUS BLD VENIPUNCTURE: CPT

## 2018-05-20 PROCEDURE — 6370000000 HC RX 637 (ALT 250 FOR IP): Performed by: FAMILY MEDICINE

## 2018-05-20 PROCEDURE — 99233 SBSQ HOSP IP/OBS HIGH 50: CPT | Performed by: INTERNAL MEDICINE

## 2018-05-20 PROCEDURE — 2580000003 HC RX 258: Performed by: FAMILY MEDICINE

## 2018-05-20 PROCEDURE — 2060000000 HC ICU INTERMEDIATE R&B

## 2018-05-20 RX ORDER — LABETALOL HYDROCHLORIDE 5 MG/ML
10 INJECTION, SOLUTION INTRAVENOUS ONCE
Status: COMPLETED | OUTPATIENT
Start: 2018-05-20 | End: 2018-05-20

## 2018-05-20 RX ADMIN — PANTOPRAZOLE SODIUM 40 MG: 40 INJECTION, POWDER, FOR SOLUTION INTRAVENOUS at 09:25

## 2018-05-20 RX ADMIN — IPRATROPIUM BROMIDE AND ALBUTEROL SULFATE 1 AMPULE: 2.5; .5 SOLUTION RESPIRATORY (INHALATION) at 08:48

## 2018-05-20 RX ADMIN — Medication 10 ML: at 09:25

## 2018-05-20 RX ADMIN — LABETALOL HYDROCHLORIDE 10 MG: 5 INJECTION, SOLUTION INTRAVENOUS at 04:39

## 2018-05-20 RX ADMIN — BRIMONIDINE TARTRATE 1 DROP: 2 SOLUTION/ DROPS OPHTHALMIC at 22:33

## 2018-05-20 RX ADMIN — TIMOLOL MALEATE 1 DROP: 5 SOLUTION OPHTHALMIC at 09:34

## 2018-05-20 RX ADMIN — BRIMONIDINE TARTRATE 1 DROP: 2 SOLUTION/ DROPS OPHTHALMIC at 09:26

## 2018-05-20 RX ADMIN — HEPARIN SODIUM 18 UNITS/KG/HR: 10000 INJECTION, SOLUTION INTRAVENOUS at 14:05

## 2018-05-20 RX ADMIN — TIMOLOL MALEATE 1 DROP: 5 SOLUTION OPHTHALMIC at 22:33

## 2018-05-20 RX ADMIN — LEVOTHYROXINE SODIUM 50 MCG: 50 TABLET ORAL at 06:50

## 2018-05-20 RX ADMIN — Medication 1000 MG: at 09:25

## 2018-05-20 RX ADMIN — Medication 10 ML: at 09:35

## 2018-05-20 RX ADMIN — IPRATROPIUM BROMIDE AND ALBUTEROL SULFATE 1 AMPULE: 2.5; .5 SOLUTION RESPIRATORY (INHALATION) at 15:37

## 2018-05-20 ASSESSMENT — PAIN SCALES - GENERAL
PAINLEVEL_OUTOF10: 0

## 2018-05-21 VITALS
WEIGHT: 131.13 LBS | RESPIRATION RATE: 16 BRPM | BODY MASS INDEX: 23.23 KG/M2 | HEART RATE: 110 BPM | HEIGHT: 63 IN | DIASTOLIC BLOOD PRESSURE: 84 MMHG | OXYGEN SATURATION: 92 % | SYSTOLIC BLOOD PRESSURE: 142 MMHG | TEMPERATURE: 98 F

## 2018-05-21 PROBLEM — A41.9 SEPSIS (HCC): Status: RESOLVED | Noted: 2018-05-16 | Resolved: 2018-05-21

## 2018-05-21 LAB
ANION GAP SERPL CALCULATED.3IONS-SCNC: 13 MMOL/L (ref 7–16)
APTT: 70.7 SEC (ref 24.5–35.1)
BLOOD CULTURE, ROUTINE: NORMAL
BLOOD CULTURE, ROUTINE: NORMAL
BUN BLDV-MCNC: 7 MG/DL (ref 8–23)
CALCIUM SERPL-MCNC: 8.1 MG/DL (ref 8.6–10.2)
CHLORIDE BLD-SCNC: 101 MMOL/L (ref 98–107)
CO2: 27 MMOL/L (ref 22–29)
CREAT SERPL-MCNC: 0.4 MG/DL (ref 0.5–1)
CULTURE, BLOOD 2: NORMAL
CULTURE, BLOOD 2: NORMAL
GFR AFRICAN AMERICAN: >60
GFR NON-AFRICAN AMERICAN: >60 ML/MIN/1.73
GLUCOSE BLD-MCNC: 106 MG/DL (ref 74–109)
HCT VFR BLD CALC: 28.8 % (ref 34–48)
HEMOGLOBIN: 9.1 G/DL (ref 11.5–15.5)
INR BLD: 3.4
MCH RBC QN AUTO: 27 PG (ref 26–35)
MCHC RBC AUTO-ENTMCNC: 31.6 % (ref 32–34.5)
MCV RBC AUTO: 85.5 FL (ref 80–99.9)
PDW BLD-RTO: 15.3 FL (ref 11.5–15)
PLATELET # BLD: 719 E9/L (ref 130–450)
PMV BLD AUTO: 8.6 FL (ref 7–12)
POTASSIUM SERPL-SCNC: 2.9 MMOL/L (ref 3.5–5)
PROTHROMBIN TIME: 39 SEC (ref 9.3–12.4)
RBC # BLD: 3.37 E12/L (ref 3.5–5.5)
SODIUM BLD-SCNC: 141 MMOL/L (ref 132–146)
WBC # BLD: 11.1 E9/L (ref 4.5–11.5)

## 2018-05-21 PROCEDURE — C9113 INJ PANTOPRAZOLE SODIUM, VIA: HCPCS | Performed by: INTERNAL MEDICINE

## 2018-05-21 PROCEDURE — 6370000000 HC RX 637 (ALT 250 FOR IP): Performed by: FAMILY MEDICINE

## 2018-05-21 PROCEDURE — 36415 COLL VENOUS BLD VENIPUNCTURE: CPT

## 2018-05-21 PROCEDURE — 85027 COMPLETE CBC AUTOMATED: CPT

## 2018-05-21 PROCEDURE — 80048 BASIC METABOLIC PNL TOTAL CA: CPT

## 2018-05-21 PROCEDURE — 85610 PROTHROMBIN TIME: CPT

## 2018-05-21 PROCEDURE — 6360000002 HC RX W HCPCS: Performed by: INTERNAL MEDICINE

## 2018-05-21 PROCEDURE — 85730 THROMBOPLASTIN TIME PARTIAL: CPT

## 2018-05-21 PROCEDURE — 99233 SBSQ HOSP IP/OBS HIGH 50: CPT | Performed by: INTERNAL MEDICINE

## 2018-05-21 PROCEDURE — 6370000000 HC RX 637 (ALT 250 FOR IP): Performed by: INTERNAL MEDICINE

## 2018-05-21 RX ORDER — FERROUS SULFATE 300 MG/5ML
300 LIQUID (ML) ORAL DAILY
Qty: 140 ML | Refills: 0 | Status: SHIPPED | OUTPATIENT
Start: 2018-05-21 | End: 2020-02-10

## 2018-05-21 RX ORDER — POTASSIUM CHLORIDE 20 MEQ/1
80 TABLET, EXTENDED RELEASE ORAL ONCE
Status: COMPLETED | OUTPATIENT
Start: 2018-05-21 | End: 2018-05-21

## 2018-05-21 RX ORDER — WARFARIN SODIUM 4 MG/1
4 TABLET ORAL DAILY
Qty: 30 TABLET | Refills: 3 | Status: ON HOLD | OUTPATIENT
Start: 2018-05-21 | End: 2019-12-10

## 2018-05-21 RX ADMIN — BRIMONIDINE TARTRATE 1 DROP: 2 SOLUTION/ DROPS OPHTHALMIC at 09:52

## 2018-05-21 RX ADMIN — Medication 1000 MG: at 09:52

## 2018-05-21 RX ADMIN — TIMOLOL MALEATE 1 DROP: 5 SOLUTION OPHTHALMIC at 09:52

## 2018-05-21 RX ADMIN — PANTOPRAZOLE SODIUM 40 MG: 40 INJECTION, POWDER, FOR SOLUTION INTRAVENOUS at 09:53

## 2018-05-21 RX ADMIN — POTASSIUM CHLORIDE 80 MEQ: 20 TABLET, EXTENDED RELEASE ORAL at 11:50

## 2018-05-21 RX ADMIN — LEVOTHYROXINE SODIUM 50 MCG: 50 TABLET ORAL at 05:42

## 2018-05-21 RX ADMIN — ACETAMINOPHEN 650 MG: 325 TABLET, FILM COATED ORAL at 11:50

## 2018-05-21 ASSESSMENT — PAIN SCALES - GENERAL
PAINLEVEL_OUTOF10: 0
PAINLEVEL_OUTOF10: 0
PAINLEVEL_OUTOF10: 8

## 2018-07-11 ENCOUNTER — OFFICE VISIT (OUTPATIENT)
Dept: NEUROSURGERY | Age: 78
End: 2018-07-11

## 2018-07-11 VITALS — SYSTOLIC BLOOD PRESSURE: 115 MMHG | DIASTOLIC BLOOD PRESSURE: 64 MMHG | HEART RATE: 104 BPM

## 2018-07-11 DIAGNOSIS — Z98.1 S/P LUMBAR FUSION: Primary | ICD-10-CM

## 2018-07-11 PROCEDURE — 99024 POSTOP FOLLOW-UP VISIT: CPT | Performed by: PHYSICIAN ASSISTANT

## 2018-09-25 ENCOUNTER — HOSPITAL ENCOUNTER (OUTPATIENT)
Dept: GENERAL RADIOLOGY | Age: 78
Discharge: HOME | End: 2018-09-27
Payer: MEDICARE

## 2018-09-25 ENCOUNTER — HOSPITAL ENCOUNTER (OUTPATIENT)
Age: 78
Discharge: HOME | End: 2018-09-27
Payer: MEDICARE

## 2018-09-25 ENCOUNTER — OFFICE VISIT (OUTPATIENT)
Dept: NEUROSURGERY | Age: 78
End: 2018-09-25
Payer: MEDICARE

## 2018-09-25 VITALS
HEART RATE: 81 BPM | WEIGHT: 122 LBS | SYSTOLIC BLOOD PRESSURE: 107 MMHG | BODY MASS INDEX: 21.62 KG/M2 | DIASTOLIC BLOOD PRESSURE: 65 MMHG | HEIGHT: 63 IN | OXYGEN SATURATION: 98 %

## 2018-09-25 DIAGNOSIS — Z98.1 S/P LUMBAR FUSION: ICD-10-CM

## 2018-09-25 DIAGNOSIS — M51.26 LUMBAR DISC HERNIATION: Primary | ICD-10-CM

## 2018-09-25 PROCEDURE — 1036F TOBACCO NON-USER: CPT | Performed by: PHYSICIAN ASSISTANT

## 2018-09-25 PROCEDURE — 1090F PRES/ABSN URINE INCON ASSESS: CPT | Performed by: PHYSICIAN ASSISTANT

## 2018-09-25 PROCEDURE — G8420 CALC BMI NORM PARAMETERS: HCPCS | Performed by: PHYSICIAN ASSISTANT

## 2018-09-25 PROCEDURE — G8399 PT W/DXA RESULTS DOCUMENT: HCPCS | Performed by: PHYSICIAN ASSISTANT

## 2018-09-25 PROCEDURE — 1101F PT FALLS ASSESS-DOCD LE1/YR: CPT | Performed by: PHYSICIAN ASSISTANT

## 2018-09-25 PROCEDURE — G8427 DOCREV CUR MEDS BY ELIG CLIN: HCPCS | Performed by: PHYSICIAN ASSISTANT

## 2018-09-25 PROCEDURE — 4040F PNEUMOC VAC/ADMIN/RCVD: CPT | Performed by: PHYSICIAN ASSISTANT

## 2018-09-25 PROCEDURE — 99212 OFFICE O/P EST SF 10 MIN: CPT | Performed by: PHYSICIAN ASSISTANT

## 2018-09-25 PROCEDURE — 72100 X-RAY EXAM L-S SPINE 2/3 VWS: CPT

## 2018-09-25 PROCEDURE — 1123F ACP DISCUSS/DSCN MKR DOCD: CPT | Performed by: PHYSICIAN ASSISTANT

## 2019-04-29 ENCOUNTER — HOSPITAL ENCOUNTER (OUTPATIENT)
Age: 79
Discharge: HOME | End: 2019-05-01
Payer: MEDICARE

## 2019-04-29 ENCOUNTER — HOSPITAL ENCOUNTER (OUTPATIENT)
Dept: GENERAL RADIOLOGY | Age: 79
Discharge: HOME | End: 2019-05-01
Payer: MEDICARE

## 2019-04-29 ENCOUNTER — OFFICE VISIT (OUTPATIENT)
Dept: NEUROSURGERY | Age: 79
End: 2019-04-29
Payer: MEDICARE

## 2019-04-29 VITALS
HEIGHT: 63 IN | HEART RATE: 85 BPM | WEIGHT: 143 LBS | DIASTOLIC BLOOD PRESSURE: 71 MMHG | SYSTOLIC BLOOD PRESSURE: 123 MMHG | BODY MASS INDEX: 25.34 KG/M2

## 2019-04-29 DIAGNOSIS — G95.19 NEUROGENIC CLAUDICATION (HCC): ICD-10-CM

## 2019-04-29 DIAGNOSIS — M43.16 SPONDYLOLISTHESIS OF LUMBAR REGION: Primary | ICD-10-CM

## 2019-04-29 DIAGNOSIS — M51.26 LUMBAR DISC HERNIATION: ICD-10-CM

## 2019-04-29 DIAGNOSIS — M48.062 LUMBAR STENOSIS WITH NEUROGENIC CLAUDICATION: ICD-10-CM

## 2019-04-29 DIAGNOSIS — Z98.1 S/P LUMBAR FUSION: ICD-10-CM

## 2019-04-29 PROCEDURE — G8419 CALC BMI OUT NRM PARAM NOF/U: HCPCS | Performed by: PHYSICIAN ASSISTANT

## 2019-04-29 PROCEDURE — 72100 X-RAY EXAM L-S SPINE 2/3 VWS: CPT

## 2019-04-29 PROCEDURE — 1036F TOBACCO NON-USER: CPT | Performed by: PHYSICIAN ASSISTANT

## 2019-04-29 PROCEDURE — 1123F ACP DISCUSS/DSCN MKR DOCD: CPT | Performed by: PHYSICIAN ASSISTANT

## 2019-04-29 PROCEDURE — G8399 PT W/DXA RESULTS DOCUMENT: HCPCS | Performed by: PHYSICIAN ASSISTANT

## 2019-04-29 PROCEDURE — 4040F PNEUMOC VAC/ADMIN/RCVD: CPT | Performed by: PHYSICIAN ASSISTANT

## 2019-04-29 PROCEDURE — 72070 X-RAY EXAM THORAC SPINE 2VWS: CPT

## 2019-04-29 PROCEDURE — 1090F PRES/ABSN URINE INCON ASSESS: CPT | Performed by: PHYSICIAN ASSISTANT

## 2019-04-29 PROCEDURE — G8427 DOCREV CUR MEDS BY ELIG CLIN: HCPCS | Performed by: PHYSICIAN ASSISTANT

## 2019-04-29 PROCEDURE — 99213 OFFICE O/P EST LOW 20 MIN: CPT | Performed by: PHYSICIAN ASSISTANT

## 2019-04-29 NOTE — PROGRESS NOTES
Post Operative Follow-up     This is a 70year old female who presents to the office for a one year follow-up s/p T10-pelvis fusion      Subjective: Azeb Roque is a 70 y.o.  female who has a past medical history of  hyperlipidemia, sacroiliitis, appendectomy, cholecystectomy and tonsillectomy. She initially presented 2017 with symptoms of back pain and neurogenic claudication. Imaging demonstrated moderate to severe stenosis at L2-3, L3-4, and L4-5 with motion of L4 on L5 with flexion and extension. Pt also had lumbar lordosis with pelvic incidence mismatch and thoracolumbar scoliosis measuring 43° of curve to the left. She underwent [id number] fusion by Dr. Gloria Chance on 18. Pt presents to the office for a one year follow up. Pt states she has been doing really well. Denies new pain, weakness, or numbness. X-rays reviewed.          Physical Exam:              WDWN, no apparent distress              Non-labored breathing               Vitals Stable              Alert and oriented x3              CN 3-12 intact              PERRL              EOMI              WASHBURN well              Motor strength symmetric              Sensation to LT intact bilaterally   Incision healed well                Imagin19 thoracic and lumbar spine x-rays reviewed and are stable. No hardware failure noted.      Assessment: This is a 70 y.o.  female presenting for a one year follow-up s/p T10-pelvis fusion. Stable.     Plan:  -Pt doing well. X-rays stable. -Follow-up in neurosurgery clinic PRN  -Call or return to neurosurgery office sooner if symptoms worsen or if new issues arise in the interim.     Electronically signed by Chanda Carbone PA-C on 2019 at 1:59 PM

## 2019-04-30 ENCOUNTER — TELEPHONE (OUTPATIENT)
Dept: NEUROSURGERY | Age: 79
End: 2019-04-30

## 2019-11-27 ENCOUNTER — TELEPHONE (OUTPATIENT)
Dept: NEUROSURGERY | Age: 79
End: 2019-11-27

## 2019-11-27 DIAGNOSIS — M43.25 FUSION OF SPINE OF THORACOLUMBAR REGION: ICD-10-CM

## 2019-11-27 DIAGNOSIS — M54.50 ACUTE LOW BACK PAIN WITHOUT SCIATICA, UNSPECIFIED BACK PAIN LATERALITY: Primary | ICD-10-CM

## 2019-11-27 DIAGNOSIS — Z98.1 S/P LUMBAR FUSION: Primary | ICD-10-CM

## 2019-11-27 RX ORDER — OXYCODONE HYDROCHLORIDE AND ACETAMINOPHEN 5; 325 MG/1; MG/1
1 TABLET ORAL EVERY 4 HOURS PRN
Qty: 42 TABLET | Refills: 0 | Status: SHIPPED | OUTPATIENT
Start: 2019-11-27 | End: 2019-12-04

## 2019-12-01 ENCOUNTER — HOSPITAL ENCOUNTER (OUTPATIENT)
Dept: CT IMAGING | Age: 79
Discharge: HOME | End: 2019-12-03
Payer: MEDICARE

## 2019-12-01 DIAGNOSIS — Z98.1 S/P LUMBAR FUSION: ICD-10-CM

## 2019-12-01 PROCEDURE — 72131 CT LUMBAR SPINE W/O DYE: CPT

## 2019-12-01 PROCEDURE — 72128 CT CHEST SPINE W/O DYE: CPT

## 2019-12-03 ENCOUNTER — HOSPITAL ENCOUNTER (OUTPATIENT)
Dept: GENERAL RADIOLOGY | Age: 79
Discharge: HOME | End: 2019-12-05
Payer: MEDICARE

## 2019-12-03 ENCOUNTER — HOSPITAL ENCOUNTER (OUTPATIENT)
Age: 79
Discharge: HOME | End: 2019-12-05
Payer: MEDICARE

## 2019-12-03 DIAGNOSIS — Z98.1 S/P LUMBAR FUSION: ICD-10-CM

## 2019-12-03 DIAGNOSIS — R52 PAIN: ICD-10-CM

## 2019-12-03 PROCEDURE — 72100 X-RAY EXAM L-S SPINE 2/3 VWS: CPT

## 2019-12-03 PROCEDURE — 72072 X-RAY EXAM THORAC SPINE 3VWS: CPT

## 2019-12-09 ENCOUNTER — OFFICE VISIT (OUTPATIENT)
Dept: NEUROSURGERY | Age: 79
End: 2019-12-09
Payer: MEDICARE

## 2019-12-09 VITALS
WEIGHT: 142 LBS | BODY MASS INDEX: 25.16 KG/M2 | HEART RATE: 88 BPM | HEIGHT: 63 IN | DIASTOLIC BLOOD PRESSURE: 64 MMHG | SYSTOLIC BLOOD PRESSURE: 119 MMHG

## 2019-12-09 DIAGNOSIS — M54.40 ACUTE LOW BACK PAIN WITH SCIATICA, SCIATICA LATERALITY UNSPECIFIED, UNSPECIFIED BACK PAIN LATERALITY: Primary | ICD-10-CM

## 2019-12-09 PROCEDURE — 1036F TOBACCO NON-USER: CPT | Performed by: NEUROLOGICAL SURGERY

## 2019-12-09 PROCEDURE — G8427 DOCREV CUR MEDS BY ELIG CLIN: HCPCS | Performed by: NEUROLOGICAL SURGERY

## 2019-12-09 PROCEDURE — G8399 PT W/DXA RESULTS DOCUMENT: HCPCS | Performed by: NEUROLOGICAL SURGERY

## 2019-12-09 PROCEDURE — G8484 FLU IMMUNIZE NO ADMIN: HCPCS | Performed by: NEUROLOGICAL SURGERY

## 2019-12-09 PROCEDURE — 4040F PNEUMOC VAC/ADMIN/RCVD: CPT | Performed by: NEUROLOGICAL SURGERY

## 2019-12-09 PROCEDURE — 1090F PRES/ABSN URINE INCON ASSESS: CPT | Performed by: NEUROLOGICAL SURGERY

## 2019-12-09 PROCEDURE — 1123F ACP DISCUSS/DSCN MKR DOCD: CPT | Performed by: NEUROLOGICAL SURGERY

## 2019-12-09 PROCEDURE — G8419 CALC BMI OUT NRM PARAM NOF/U: HCPCS | Performed by: NEUROLOGICAL SURGERY

## 2019-12-09 PROCEDURE — 99214 OFFICE O/P EST MOD 30 MIN: CPT | Performed by: NEUROLOGICAL SURGERY

## 2019-12-10 ENCOUNTER — HOSPITAL ENCOUNTER (INPATIENT)
Age: 79
LOS: 6 days | Discharge: NURSING, REHAB OR LONG-TERM CARE | DRG: 460 | End: 2019-12-16
Attending: NEUROLOGICAL SURGERY | Admitting: NEUROLOGICAL SURGERY
Payer: MEDICARE

## 2019-12-10 DIAGNOSIS — M54.50 LOW BACK PAIN, UNSPECIFIED BACK PAIN LATERALITY, UNSPECIFIED CHRONICITY, UNSPECIFIED WHETHER SCIATICA PRESENT: ICD-10-CM

## 2019-12-10 DIAGNOSIS — M48.062 LUMBAR STENOSIS WITH NEUROGENIC CLAUDICATION: Primary | ICD-10-CM

## 2019-12-10 PROCEDURE — 2580000003 HC RX 258: Performed by: NEUROLOGICAL SURGERY

## 2019-12-10 PROCEDURE — 93005 ELECTROCARDIOGRAM TRACING: CPT | Performed by: FAMILY MEDICINE

## 2019-12-10 PROCEDURE — 1200000000 HC SEMI PRIVATE

## 2019-12-10 PROCEDURE — 6360000002 HC RX W HCPCS: Performed by: NEUROLOGICAL SURGERY

## 2019-12-10 PROCEDURE — 6370000000 HC RX 637 (ALT 250 FOR IP): Performed by: NEUROLOGICAL SURGERY

## 2019-12-10 RX ORDER — SENNA AND DOCUSATE SODIUM 50; 8.6 MG/1; MG/1
1 TABLET, FILM COATED ORAL 2 TIMES DAILY
Status: DISCONTINUED | OUTPATIENT
Start: 2019-12-10 | End: 2019-12-16 | Stop reason: HOSPADM

## 2019-12-10 RX ORDER — ROSUVASTATIN CALCIUM 10 MG/1
TABLET, COATED ORAL
Refills: 0 | COMMUNITY
Start: 2019-11-25

## 2019-12-10 RX ORDER — CLOBETASOL PROPIONATE 0.5 MG/G
OINTMENT TOPICAL 2 TIMES DAILY PRN
COMMUNITY

## 2019-12-10 RX ORDER — LOTEPREDNOL ETABONATE 5 MG/ML
1 SUSPENSION/ DROPS OPHTHALMIC 2 TIMES DAILY
COMMUNITY

## 2019-12-10 RX ORDER — SODIUM CHLORIDE 0.9 % (FLUSH) 0.9 %
10 SYRINGE (ML) INJECTION PRN
Status: DISCONTINUED | OUTPATIENT
Start: 2019-12-10 | End: 2019-12-16 | Stop reason: HOSPADM

## 2019-12-10 RX ORDER — OXYCODONE HYDROCHLORIDE 10 MG/1
10 TABLET ORAL EVERY 4 HOURS PRN
Status: DISCONTINUED | OUTPATIENT
Start: 2019-12-10 | End: 2019-12-12

## 2019-12-10 RX ORDER — ONDANSETRON 2 MG/ML
4 INJECTION INTRAMUSCULAR; INTRAVENOUS EVERY 6 HOURS PRN
Status: DISCONTINUED | OUTPATIENT
Start: 2019-12-10 | End: 2019-12-12 | Stop reason: SDUPTHER

## 2019-12-10 RX ORDER — CETIRIZINE HYDROCHLORIDE 10 MG/1
10 TABLET ORAL DAILY
COMMUNITY

## 2019-12-10 RX ORDER — OXYCODONE HYDROCHLORIDE AND ACETAMINOPHEN 5; 325 MG/1; MG/1
1 TABLET ORAL EVERY 4 HOURS PRN
Status: ON HOLD | COMMUNITY
End: 2019-12-13 | Stop reason: HOSPADM

## 2019-12-10 RX ORDER — SODIUM CHLORIDE 0.9 % (FLUSH) 0.9 %
10 SYRINGE (ML) INJECTION EVERY 12 HOURS SCHEDULED
Status: DISCONTINUED | OUTPATIENT
Start: 2019-12-10 | End: 2019-12-12 | Stop reason: SDUPTHER

## 2019-12-10 RX ORDER — ICOSAPENT ETHYL 1000 MG/1
2 CAPSULE ORAL 2 TIMES DAILY
COMMUNITY

## 2019-12-10 RX ORDER — OXYCODONE HYDROCHLORIDE 5 MG/1
5 TABLET ORAL EVERY 4 HOURS PRN
Status: DISCONTINUED | OUTPATIENT
Start: 2019-12-10 | End: 2019-12-12

## 2019-12-10 RX ADMIN — SENNOSIDES AND DOCUSATE SODIUM 1 TABLET: 8.6; 5 TABLET ORAL at 21:02

## 2019-12-10 RX ADMIN — SODIUM CHLORIDE, PRESERVATIVE FREE 10 ML: 5 INJECTION INTRAVENOUS at 21:03

## 2019-12-10 RX ADMIN — OXYCODONE HYDROCHLORIDE 5 MG: 5 TABLET ORAL at 22:02

## 2019-12-10 RX ADMIN — OXYCODONE HYDROCHLORIDE 10 MG: 10 TABLET ORAL at 17:38

## 2019-12-10 RX ADMIN — ENOXAPARIN SODIUM 40 MG: 40 INJECTION SUBCUTANEOUS at 17:38

## 2019-12-10 ASSESSMENT — PAIN SCALES - GENERAL
PAINLEVEL_OUTOF10: 4
PAINLEVEL_OUTOF10: 5
PAINLEVEL_OUTOF10: 5
PAINLEVEL_OUTOF10: 9

## 2019-12-10 ASSESSMENT — PAIN DESCRIPTION - LOCATION: LOCATION: BACK

## 2019-12-10 ASSESSMENT — PAIN DESCRIPTION - PAIN TYPE: TYPE: ACUTE PAIN

## 2019-12-10 ASSESSMENT — PAIN DESCRIPTION - DESCRIPTORS: DESCRIPTORS: ACHING;DISCOMFORT

## 2019-12-11 ENCOUNTER — ANESTHESIA EVENT (OUTPATIENT)
Dept: OPERATING ROOM | Age: 79
DRG: 460 | End: 2019-12-11
Payer: MEDICARE

## 2019-12-11 LAB
ABO/RH: NORMAL
ANION GAP SERPL CALCULATED.3IONS-SCNC: 11 MMOL/L (ref 7–16)
ANTIBODY SCREEN: NORMAL
APTT: 32.5 SEC (ref 24.5–35.1)
BUN BLDV-MCNC: 26 MG/DL (ref 8–23)
CALCIUM SERPL-MCNC: 9.7 MG/DL (ref 8.6–10.2)
CHLORIDE BLD-SCNC: 102 MMOL/L (ref 98–107)
CO2: 26 MMOL/L (ref 22–29)
CREAT SERPL-MCNC: 0.8 MG/DL (ref 0.5–1)
EKG ATRIAL RATE: 87 BPM
EKG P AXIS: 44 DEGREES
EKG P-R INTERVAL: 206 MS
EKG Q-T INTERVAL: 380 MS
EKG QRS DURATION: 92 MS
EKG QTC CALCULATION (BAZETT): 457 MS
EKG R AXIS: -36 DEGREES
EKG T AXIS: 43 DEGREES
EKG VENTRICULAR RATE: 87 BPM
GFR AFRICAN AMERICAN: >60
GFR NON-AFRICAN AMERICAN: >60 ML/MIN/1.73
GLUCOSE BLD-MCNC: 113 MG/DL (ref 74–99)
HCT VFR BLD CALC: 39 % (ref 34–48)
HEMOGLOBIN: 12.3 G/DL (ref 11.5–15.5)
INR BLD: 1
MCH RBC QN AUTO: 28.9 PG (ref 26–35)
MCHC RBC AUTO-ENTMCNC: 31.5 % (ref 32–34.5)
MCV RBC AUTO: 91.8 FL (ref 80–99.9)
PDW BLD-RTO: 13.5 FL (ref 11.5–15)
PLATELET # BLD: 395 E9/L (ref 130–450)
PMV BLD AUTO: 8.8 FL (ref 7–12)
POTASSIUM REFLEX MAGNESIUM: 4.1 MMOL/L (ref 3.5–5)
PROTHROMBIN TIME: 11.6 SEC (ref 9.3–12.4)
RBC # BLD: 4.25 E12/L (ref 3.5–5.5)
SODIUM BLD-SCNC: 139 MMOL/L (ref 132–146)
WBC # BLD: 9.1 E9/L (ref 4.5–11.5)

## 2019-12-11 PROCEDURE — 86901 BLOOD TYPING SEROLOGIC RH(D): CPT

## 2019-12-11 PROCEDURE — 86900 BLOOD TYPING SEROLOGIC ABO: CPT

## 2019-12-11 PROCEDURE — 80048 BASIC METABOLIC PNL TOTAL CA: CPT

## 2019-12-11 PROCEDURE — 6370000000 HC RX 637 (ALT 250 FOR IP): Performed by: NEUROLOGICAL SURGERY

## 2019-12-11 PROCEDURE — 85027 COMPLETE CBC AUTOMATED: CPT

## 2019-12-11 PROCEDURE — 6370000000 HC RX 637 (ALT 250 FOR IP): Performed by: FAMILY MEDICINE

## 2019-12-11 PROCEDURE — 36415 COLL VENOUS BLD VENIPUNCTURE: CPT

## 2019-12-11 PROCEDURE — 86850 RBC ANTIBODY SCREEN: CPT

## 2019-12-11 PROCEDURE — 99232 SBSQ HOSP IP/OBS MODERATE 35: CPT | Performed by: NEUROLOGICAL SURGERY

## 2019-12-11 PROCEDURE — 6360000002 HC RX W HCPCS: Performed by: NEUROLOGICAL SURGERY

## 2019-12-11 PROCEDURE — 85610 PROTHROMBIN TIME: CPT

## 2019-12-11 PROCEDURE — 85730 THROMBOPLASTIN TIME PARTIAL: CPT

## 2019-12-11 PROCEDURE — 1200000000 HC SEMI PRIVATE

## 2019-12-11 PROCEDURE — 93010 ELECTROCARDIOGRAM REPORT: CPT | Performed by: INTERNAL MEDICINE

## 2019-12-11 PROCEDURE — 2580000003 HC RX 258: Performed by: NEUROLOGICAL SURGERY

## 2019-12-11 RX ORDER — ICOSAPENT ETHYL 1000 MG/1
2 CAPSULE ORAL 2 TIMES DAILY
Status: DISCONTINUED | OUTPATIENT
Start: 2019-12-11 | End: 2019-12-11 | Stop reason: CLARIF

## 2019-12-11 RX ORDER — DOXEPIN HYDROCHLORIDE 50 MG/1
50 CAPSULE ORAL 4 TIMES DAILY
Status: DISCONTINUED | OUTPATIENT
Start: 2019-12-11 | End: 2019-12-16 | Stop reason: HOSPADM

## 2019-12-11 RX ORDER — TIMOLOL MALEATE 5 MG/ML
1 SOLUTION/ DROPS OPHTHALMIC 2 TIMES DAILY
Status: DISCONTINUED | OUTPATIENT
Start: 2019-12-11 | End: 2019-12-16 | Stop reason: HOSPADM

## 2019-12-11 RX ORDER — BRIMONIDINE TARTRATE 2 MG/ML
1 SOLUTION/ DROPS OPHTHALMIC 2 TIMES DAILY
Status: DISCONTINUED | OUTPATIENT
Start: 2019-12-11 | End: 2019-12-16 | Stop reason: HOSPADM

## 2019-12-11 RX ORDER — SODIUM CHLORIDE 9 MG/ML
INJECTION, SOLUTION INTRAVENOUS CONTINUOUS
Status: DISCONTINUED | OUTPATIENT
Start: 2019-12-11 | End: 2019-12-16 | Stop reason: HOSPADM

## 2019-12-11 RX ORDER — ROSUVASTATIN CALCIUM 10 MG/1
10 TABLET, COATED ORAL DAILY
Status: DISCONTINUED | OUTPATIENT
Start: 2019-12-11 | End: 2019-12-16 | Stop reason: HOSPADM

## 2019-12-11 RX ORDER — ACETAMINOPHEN 325 MG/1
650 TABLET ORAL EVERY 6 HOURS PRN
Status: DISCONTINUED | OUTPATIENT
Start: 2019-12-11 | End: 2019-12-16 | Stop reason: HOSPADM

## 2019-12-11 RX ORDER — CETIRIZINE HYDROCHLORIDE 10 MG/1
10 TABLET ORAL DAILY
Status: DISCONTINUED | OUTPATIENT
Start: 2019-12-11 | End: 2019-12-16 | Stop reason: HOSPADM

## 2019-12-11 RX ORDER — LEVOTHYROXINE SODIUM 0.05 MG/1
50 TABLET ORAL DAILY
Status: DISCONTINUED | OUTPATIENT
Start: 2019-12-11 | End: 2019-12-16 | Stop reason: HOSPADM

## 2019-12-11 RX ORDER — OMEGA-3-ACID ETHYL ESTERS 1 G/1
2 CAPSULE, LIQUID FILLED ORAL 2 TIMES DAILY
Status: DISCONTINUED | OUTPATIENT
Start: 2019-12-11 | End: 2019-12-16 | Stop reason: HOSPADM

## 2019-12-11 RX ORDER — FERROUS SULFATE 300 MG/5ML
300 LIQUID (ML) ORAL DAILY
Status: DISCONTINUED | OUTPATIENT
Start: 2019-12-11 | End: 2019-12-16 | Stop reason: HOSPADM

## 2019-12-11 RX ORDER — BRIMONIDINE TARTRATE, TIMOLOL MALEATE 2; 5 MG/ML; MG/ML
1 SOLUTION/ DROPS OPHTHALMIC 2 TIMES DAILY
Status: DISCONTINUED | OUTPATIENT
Start: 2019-12-11 | End: 2019-12-11 | Stop reason: CLARIF

## 2019-12-11 RX ORDER — CEFAZOLIN SODIUM 2 G/50ML
2 SOLUTION INTRAVENOUS SEE ADMIN INSTRUCTIONS
Status: DISCONTINUED | OUTPATIENT
Start: 2019-12-11 | End: 2019-12-12

## 2019-12-11 RX ORDER — KETOCONAZOLE 20 MG/ML
1 SHAMPOO TOPICAL
Status: DISCONTINUED | OUTPATIENT
Start: 2019-12-11 | End: 2019-12-16 | Stop reason: HOSPADM

## 2019-12-11 RX ORDER — LOTEPREDNOL ETABONATE 5 MG/ML
1 SUSPENSION/ DROPS OPHTHALMIC 2 TIMES DAILY
Status: DISCONTINUED | OUTPATIENT
Start: 2019-12-11 | End: 2019-12-16 | Stop reason: HOSPADM

## 2019-12-11 RX ORDER — LATANOPROST 50 UG/ML
1 SOLUTION/ DROPS OPHTHALMIC NIGHTLY
Status: DISCONTINUED | OUTPATIENT
Start: 2019-12-11 | End: 2019-12-16 | Stop reason: HOSPADM

## 2019-12-11 RX ADMIN — OMEGA-3-ACID ETHYL ESTERS 2 G: 1 CAPSULE, LIQUID FILLED ORAL at 19:58

## 2019-12-11 RX ADMIN — SODIUM CHLORIDE, PRESERVATIVE FREE 10 ML: 5 INJECTION INTRAVENOUS at 09:59

## 2019-12-11 RX ADMIN — ENOXAPARIN SODIUM 40 MG: 40 INJECTION SUBCUTANEOUS at 09:58

## 2019-12-11 RX ADMIN — ROSUVASTATIN CALCIUM 10 MG: 10 TABLET, FILM COATED ORAL at 09:59

## 2019-12-11 RX ADMIN — TIMOLOL MALEATE 1 DROP: 5 SOLUTION OPHTHALMIC at 20:01

## 2019-12-11 RX ADMIN — OXYCODONE HYDROCHLORIDE 10 MG: 10 TABLET ORAL at 04:46

## 2019-12-11 RX ADMIN — TIMOLOL MALEATE 1 DROP: 5 SOLUTION OPHTHALMIC at 09:58

## 2019-12-11 RX ADMIN — OXYCODONE HYDROCHLORIDE 10 MG: 10 TABLET ORAL at 09:57

## 2019-12-11 RX ADMIN — DOXEPIN HYDROCHLORIDE 50 MG: 50 CAPSULE ORAL at 18:21

## 2019-12-11 RX ADMIN — DOXEPIN HYDROCHLORIDE 50 MG: 50 CAPSULE ORAL at 20:00

## 2019-12-11 RX ADMIN — BRIMONIDINE TARTRATE 1 DROP: 2 SOLUTION/ DROPS OPHTHALMIC at 09:58

## 2019-12-11 RX ADMIN — BRIMONIDINE TARTRATE 1 DROP: 2 SOLUTION/ DROPS OPHTHALMIC at 20:01

## 2019-12-11 RX ADMIN — DOXEPIN HYDROCHLORIDE 50 MG: 50 CAPSULE ORAL at 09:59

## 2019-12-11 RX ADMIN — SENNOSIDES AND DOCUSATE SODIUM 1 TABLET: 8.6; 5 TABLET ORAL at 09:57

## 2019-12-11 RX ADMIN — DOXEPIN HYDROCHLORIDE 50 MG: 50 CAPSULE ORAL at 14:53

## 2019-12-11 RX ADMIN — LATANOPROST 1 DROP: 50 SOLUTION OPHTHALMIC at 20:01

## 2019-12-11 RX ADMIN — SODIUM CHLORIDE, PRESERVATIVE FREE 10 ML: 5 INJECTION INTRAVENOUS at 20:03

## 2019-12-11 RX ADMIN — LEVOTHYROXINE SODIUM 50 MCG: 50 TABLET ORAL at 09:58

## 2019-12-11 RX ADMIN — MINERAL SUPPLEMENT IRON 300 MG / 5 ML STRENGTH LIQUID 100 PER BOX UNFLAVORED 300 MG: at 09:58

## 2019-12-11 RX ADMIN — OXYCODONE HYDROCHLORIDE 10 MG: 10 TABLET ORAL at 14:53

## 2019-12-11 RX ADMIN — OMEGA-3-ACID ETHYL ESTERS 2 G: 1 CAPSULE, LIQUID FILLED ORAL at 09:57

## 2019-12-11 RX ADMIN — CETIRIZINE HYDROCHLORIDE 10 MG: 10 TABLET, FILM COATED ORAL at 09:58

## 2019-12-11 RX ADMIN — SENNOSIDES AND DOCUSATE SODIUM 1 TABLET: 8.6; 5 TABLET ORAL at 19:58

## 2019-12-11 RX ADMIN — OXYCODONE HYDROCHLORIDE 10 MG: 10 TABLET ORAL at 19:58

## 2019-12-11 ASSESSMENT — PAIN DESCRIPTION - PAIN TYPE
TYPE: ACUTE PAIN
TYPE: ACUTE PAIN

## 2019-12-11 ASSESSMENT — PAIN SCALES - GENERAL
PAINLEVEL_OUTOF10: 6
PAINLEVEL_OUTOF10: 7
PAINLEVEL_OUTOF10: 6
PAINLEVEL_OUTOF10: 7

## 2019-12-11 ASSESSMENT — PAIN DESCRIPTION - LOCATION
LOCATION: BACK
LOCATION: BACK

## 2019-12-11 ASSESSMENT — PAIN DESCRIPTION - DESCRIPTORS
DESCRIPTORS: ACHING;DISCOMFORT
DESCRIPTORS: ACHING;DISCOMFORT

## 2019-12-12 ENCOUNTER — APPOINTMENT (OUTPATIENT)
Dept: GENERAL RADIOLOGY | Age: 79
DRG: 460 | End: 2019-12-12
Attending: NEUROLOGICAL SURGERY
Payer: MEDICARE

## 2019-12-12 ENCOUNTER — ANESTHESIA (OUTPATIENT)
Dept: OPERATING ROOM | Age: 79
DRG: 460 | End: 2019-12-12
Payer: MEDICARE

## 2019-12-12 VITALS — TEMPERATURE: 94.5 F | OXYGEN SATURATION: 100 %

## 2019-12-12 PROCEDURE — 6360000002 HC RX W HCPCS: Performed by: NURSE ANESTHETIST, CERTIFIED REGISTERED

## 2019-12-12 PROCEDURE — 6360000002 HC RX W HCPCS

## 2019-12-12 PROCEDURE — 3700000001 HC ADD 15 MINUTES (ANESTHESIA): Performed by: NEUROLOGICAL SURGERY

## 2019-12-12 PROCEDURE — 2720000010 HC SURG SUPPLY STERILE: Performed by: NEUROLOGICAL SURGERY

## 2019-12-12 PROCEDURE — 71045 X-RAY EXAM CHEST 1 VIEW: CPT

## 2019-12-12 PROCEDURE — 0PP404Z REMOVAL OF INTERNAL FIXATION DEVICE FROM THORACIC VERTEBRA, OPEN APPROACH: ICD-10-PCS | Performed by: NEUROLOGICAL SURGERY

## 2019-12-12 PROCEDURE — 22612 ARTHRD PST TQ 1NTRSPC LUMBAR: CPT | Performed by: PHYSICIAN ASSISTANT

## 2019-12-12 PROCEDURE — 7100000000 HC PACU RECOVERY - FIRST 15 MIN: Performed by: NEUROLOGICAL SURGERY

## 2019-12-12 PROCEDURE — 2700000000 HC OXYGEN THERAPY PER DAY

## 2019-12-12 PROCEDURE — 1200000000 HC SEMI PRIVATE

## 2019-12-12 PROCEDURE — 6360000002 HC RX W HCPCS: Performed by: ANESTHESIOLOGY

## 2019-12-12 PROCEDURE — 3600000015 HC SURGERY LEVEL 5 ADDTL 15MIN: Performed by: NEUROLOGICAL SURGERY

## 2019-12-12 PROCEDURE — 2709999900 HC NON-CHARGEABLE SUPPLY: Performed by: NEUROLOGICAL SURGERY

## 2019-12-12 PROCEDURE — 99232 SBSQ HOSP IP/OBS MODERATE 35: CPT | Performed by: NEUROLOGICAL SURGERY

## 2019-12-12 PROCEDURE — 6370000000 HC RX 637 (ALT 250 FOR IP): Performed by: PHYSICIAN ASSISTANT

## 2019-12-12 PROCEDURE — 2580000003 HC RX 258: Performed by: NURSE ANESTHETIST, CERTIFIED REGISTERED

## 2019-12-12 PROCEDURE — 2580000003 HC RX 258: Performed by: NEUROLOGICAL SURGERY

## 2019-12-12 PROCEDURE — 6360000002 HC RX W HCPCS: Performed by: PHYSICIAN ASSISTANT

## 2019-12-12 PROCEDURE — 3700000000 HC ANESTHESIA ATTENDED CARE: Performed by: NEUROLOGICAL SURGERY

## 2019-12-12 PROCEDURE — 0QP204Z REMOVAL OF INTERNAL FIXATION DEVICE FROM RIGHT PELVIC BONE, OPEN APPROACH: ICD-10-PCS | Performed by: NEUROLOGICAL SURGERY

## 2019-12-12 PROCEDURE — 0SG00K1 FUSION OF LUMBAR VERTEBRAL JOINT WITH NONAUTOLOGOUS TISSUE SUBSTITUTE, POSTERIOR APPROACH, POSTERIOR COLUMN, OPEN APPROACH: ICD-10-PCS | Performed by: NEUROLOGICAL SURGERY

## 2019-12-12 PROCEDURE — 22849 REINSERT SPINAL FIXATION: CPT | Performed by: PHYSICIAN ASSISTANT

## 2019-12-12 PROCEDURE — 2500000003 HC RX 250 WO HCPCS

## 2019-12-12 PROCEDURE — 88300 SURGICAL PATH GROSS: CPT

## 2019-12-12 PROCEDURE — 2500000003 HC RX 250 WO HCPCS: Performed by: NEUROLOGICAL SURGERY

## 2019-12-12 PROCEDURE — 3600000005 HC SURGERY LEVEL 5 BASE: Performed by: NEUROLOGICAL SURGERY

## 2019-12-12 PROCEDURE — 6370000000 HC RX 637 (ALT 250 FOR IP): Performed by: NEUROLOGICAL SURGERY

## 2019-12-12 PROCEDURE — 2580000003 HC RX 258: Performed by: PHYSICIAN ASSISTANT

## 2019-12-12 PROCEDURE — 0QP004Z REMOVAL OF INTERNAL FIXATION DEVICE FROM LUMBAR VERTEBRA, OPEN APPROACH: ICD-10-PCS | Performed by: NEUROLOGICAL SURGERY

## 2019-12-12 PROCEDURE — 3209999900 FLUORO FOR SURGICAL PROCEDURES

## 2019-12-12 PROCEDURE — 2500000003 HC RX 250 WO HCPCS: Performed by: NURSE ANESTHETIST, CERTIFIED REGISTERED

## 2019-12-12 PROCEDURE — C9359 IMPLNT,BON VOID FILLER-PUTTY: HCPCS | Performed by: NEUROLOGICAL SURGERY

## 2019-12-12 PROCEDURE — 0QP304Z REMOVAL OF INTERNAL FIXATION DEVICE FROM LEFT PELVIC BONE, OPEN APPROACH: ICD-10-PCS | Performed by: NEUROLOGICAL SURGERY

## 2019-12-12 PROCEDURE — 0QH004Z INSERTION OF INTERNAL FIXATION DEVICE INTO LUMBAR VERTEBRA, OPEN APPROACH: ICD-10-PCS | Performed by: NEUROLOGICAL SURGERY

## 2019-12-12 PROCEDURE — 22612 ARTHRD PST TQ 1NTRSPC LUMBAR: CPT | Performed by: NEUROLOGICAL SURGERY

## 2019-12-12 PROCEDURE — 22849 REINSERT SPINAL FIXATION: CPT | Performed by: NEUROLOGICAL SURGERY

## 2019-12-12 PROCEDURE — L0464 TLSO 4MOD SACRO-SCAP PRE: HCPCS

## 2019-12-12 PROCEDURE — C1713 ANCHOR/SCREW BN/BN,TIS/BN: HCPCS | Performed by: NEUROLOGICAL SURGERY

## 2019-12-12 PROCEDURE — 7100000001 HC PACU RECOVERY - ADDTL 15 MIN: Performed by: NEUROLOGICAL SURGERY

## 2019-12-12 PROCEDURE — 6360000002 HC RX W HCPCS: Performed by: NEUROLOGICAL SURGERY

## 2019-12-12 DEVICE — CROSSLINK 811-308 LP 5.5MM LEN 1.50 TI
Type: IMPLANTABLE DEVICE | Site: SPINE LUMBAR | Status: FUNCTIONAL
Brand: TSRH® SPINAL SYSTEM

## 2019-12-12 DEVICE — SET SCREW 5540030 5.5 TI NS BRK OFF
Type: IMPLANTABLE DEVICE | Site: SPINE LUMBAR | Status: FUNCTIONAL
Brand: CD HORIZON® SPINAL SYSTEM

## 2019-12-12 DEVICE — SCREW 55720005545 5.5/6.0 DRMAS 5.5X45
Type: IMPLANTABLE DEVICE | Site: SPINE LUMBAR | Status: FUNCTIONAL
Brand: CD HORIZON® SOLERA® SPINAL SYSTEM

## 2019-12-12 DEVICE — ALLOGRAFT BNE PTTY 5 CC DBM PROGENIX +: Type: IMPLANTABLE DEVICE | Site: SPINE LUMBAR | Status: FUNCTIONAL

## 2019-12-12 DEVICE — CROSSLINK 811-303 LP 5.5MM LEN .875 TI
Type: IMPLANTABLE DEVICE | Site: SPINE LUMBAR | Status: FUNCTIONAL
Brand: TSRH® SPINAL SYSTEM

## 2019-12-12 DEVICE — GRAFT BNE SUB 15ML 0.1-4MM CANC CRUSH CHIP MORSELIZED FRZ: Type: IMPLANTABLE DEVICE | Site: SPINE LUMBAR | Status: FUNCTIONAL

## 2019-12-12 RX ORDER — LIDOCAINE HYDROCHLORIDE 20 MG/ML
INJECTION, SOLUTION INTRAVENOUS PRN
Status: DISCONTINUED | OUTPATIENT
Start: 2019-12-12 | End: 2019-12-12 | Stop reason: SDUPTHER

## 2019-12-12 RX ORDER — LIDOCAINE HYDROCHLORIDE AND EPINEPHRINE 10; 10 MG/ML; UG/ML
INJECTION, SOLUTION INFILTRATION; PERINEURAL PRN
Status: DISCONTINUED | OUTPATIENT
Start: 2019-12-12 | End: 2019-12-12 | Stop reason: HOSPADM

## 2019-12-12 RX ORDER — DEXAMETHASONE SODIUM PHOSPHATE 10 MG/ML
INJECTION INTRAMUSCULAR; INTRAVENOUS PRN
Status: DISCONTINUED | OUTPATIENT
Start: 2019-12-12 | End: 2019-12-12 | Stop reason: SDUPTHER

## 2019-12-12 RX ORDER — SODIUM CHLORIDE 0.9 % (FLUSH) 0.9 %
10 SYRINGE (ML) INJECTION PRN
Status: DISCONTINUED | OUTPATIENT
Start: 2019-12-12 | End: 2019-12-16 | Stop reason: HOSPADM

## 2019-12-12 RX ORDER — NEOSTIGMINE METHYLSULFATE 1 MG/ML
INJECTION, SOLUTION INTRAVENOUS PRN
Status: DISCONTINUED | OUTPATIENT
Start: 2019-12-12 | End: 2019-12-12 | Stop reason: SDUPTHER

## 2019-12-12 RX ORDER — OXYCODONE HYDROCHLORIDE AND ACETAMINOPHEN 5; 325 MG/1; MG/1
1 TABLET ORAL EVERY 4 HOURS PRN
Status: DISCONTINUED | OUTPATIENT
Start: 2019-12-12 | End: 2019-12-16 | Stop reason: HOSPADM

## 2019-12-12 RX ORDER — DIAZEPAM 5 MG/1
5 TABLET ORAL EVERY 6 HOURS PRN
Status: DISCONTINUED | OUTPATIENT
Start: 2019-12-12 | End: 2019-12-16 | Stop reason: HOSPADM

## 2019-12-12 RX ORDER — CEFAZOLIN SODIUM 1 G/3ML
INJECTION, POWDER, FOR SOLUTION INTRAMUSCULAR; INTRAVENOUS PRN
Status: DISCONTINUED | OUTPATIENT
Start: 2019-12-12 | End: 2019-12-12 | Stop reason: SDUPTHER

## 2019-12-12 RX ORDER — ONDANSETRON 2 MG/ML
4 INJECTION INTRAMUSCULAR; INTRAVENOUS EVERY 6 HOURS PRN
Status: DISCONTINUED | OUTPATIENT
Start: 2019-12-12 | End: 2019-12-16 | Stop reason: HOSPADM

## 2019-12-12 RX ORDER — VANCOMYCIN HYDROCHLORIDE 1 G/20ML
INJECTION, POWDER, LYOPHILIZED, FOR SOLUTION INTRAVENOUS PRN
Status: DISCONTINUED | OUTPATIENT
Start: 2019-12-12 | End: 2019-12-12 | Stop reason: HOSPADM

## 2019-12-12 RX ORDER — VECURONIUM BROMIDE 1 MG/ML
INJECTION, POWDER, LYOPHILIZED, FOR SOLUTION INTRAVENOUS PRN
Status: DISCONTINUED | OUTPATIENT
Start: 2019-12-12 | End: 2019-12-12 | Stop reason: SDUPTHER

## 2019-12-12 RX ORDER — SODIUM CHLORIDE, SODIUM LACTATE, POTASSIUM CHLORIDE, CALCIUM CHLORIDE 600; 310; 30; 20 MG/100ML; MG/100ML; MG/100ML; MG/100ML
INJECTION, SOLUTION INTRAVENOUS CONTINUOUS PRN
Status: DISCONTINUED | OUTPATIENT
Start: 2019-12-12 | End: 2019-12-12 | Stop reason: SDUPTHER

## 2019-12-12 RX ORDER — PROPOFOL 10 MG/ML
INJECTION, EMULSION INTRAVENOUS PRN
Status: DISCONTINUED | OUTPATIENT
Start: 2019-12-12 | End: 2019-12-12 | Stop reason: SDUPTHER

## 2019-12-12 RX ORDER — MORPHINE SULFATE 2 MG/ML
1 INJECTION, SOLUTION INTRAMUSCULAR; INTRAVENOUS EVERY 5 MIN PRN
Status: DISCONTINUED | OUTPATIENT
Start: 2019-12-12 | End: 2019-12-12 | Stop reason: HOSPADM

## 2019-12-12 RX ORDER — GLYCOPYRROLATE 1 MG/5 ML
SYRINGE (ML) INTRAVENOUS PRN
Status: DISCONTINUED | OUTPATIENT
Start: 2019-12-12 | End: 2019-12-12 | Stop reason: SDUPTHER

## 2019-12-12 RX ORDER — OXYCODONE HYDROCHLORIDE AND ACETAMINOPHEN 5; 325 MG/1; MG/1
2 TABLET ORAL EVERY 4 HOURS PRN
Status: DISCONTINUED | OUTPATIENT
Start: 2019-12-12 | End: 2019-12-16 | Stop reason: HOSPADM

## 2019-12-12 RX ORDER — OXYCODONE HYDROCHLORIDE AND ACETAMINOPHEN 5; 325 MG/1; MG/1
2 TABLET ORAL EVERY 4 HOURS PRN
Status: DISCONTINUED | OUTPATIENT
Start: 2019-12-12 | End: 2019-12-12 | Stop reason: SDUPTHER

## 2019-12-12 RX ORDER — SODIUM CHLORIDE 0.9 % (FLUSH) 0.9 %
10 SYRINGE (ML) INJECTION EVERY 12 HOURS SCHEDULED
Status: DISCONTINUED | OUTPATIENT
Start: 2019-12-12 | End: 2019-12-16 | Stop reason: HOSPADM

## 2019-12-12 RX ORDER — DOCUSATE SODIUM 100 MG/1
100 CAPSULE, LIQUID FILLED ORAL 2 TIMES DAILY
Status: DISCONTINUED | OUTPATIENT
Start: 2019-12-12 | End: 2019-12-16 | Stop reason: HOSPADM

## 2019-12-12 RX ORDER — MORPHINE SULFATE 2 MG/ML
2 INJECTION, SOLUTION INTRAMUSCULAR; INTRAVENOUS EVERY 5 MIN PRN
Status: DISCONTINUED | OUTPATIENT
Start: 2019-12-12 | End: 2019-12-12 | Stop reason: HOSPADM

## 2019-12-12 RX ORDER — OXYCODONE HYDROCHLORIDE AND ACETAMINOPHEN 5; 325 MG/1; MG/1
1 TABLET ORAL EVERY 4 HOURS PRN
Status: DISCONTINUED | OUTPATIENT
Start: 2019-12-12 | End: 2019-12-12 | Stop reason: SDUPTHER

## 2019-12-12 RX ORDER — CEFAZOLIN SODIUM 2 G/50ML
2 SOLUTION INTRAVENOUS EVERY 8 HOURS
Status: COMPLETED | OUTPATIENT
Start: 2019-12-12 | End: 2019-12-14

## 2019-12-12 RX ORDER — ONDANSETRON 2 MG/ML
INJECTION INTRAMUSCULAR; INTRAVENOUS PRN
Status: DISCONTINUED | OUTPATIENT
Start: 2019-12-12 | End: 2019-12-12 | Stop reason: SDUPTHER

## 2019-12-12 RX ORDER — FENTANYL CITRATE 50 UG/ML
INJECTION, SOLUTION INTRAMUSCULAR; INTRAVENOUS PRN
Status: DISCONTINUED | OUTPATIENT
Start: 2019-12-12 | End: 2019-12-12 | Stop reason: SDUPTHER

## 2019-12-12 RX ORDER — MIDAZOLAM HYDROCHLORIDE 1 MG/ML
INJECTION INTRAMUSCULAR; INTRAVENOUS PRN
Status: DISCONTINUED | OUTPATIENT
Start: 2019-12-12 | End: 2019-12-12 | Stop reason: SDUPTHER

## 2019-12-12 RX ADMIN — SODIUM CHLORIDE, PRESERVATIVE FREE 10 ML: 5 INJECTION INTRAVENOUS at 00:08

## 2019-12-12 RX ADMIN — PHENYLEPHRINE HYDROCHLORIDE 100 MCG: 10 INJECTION INTRAVENOUS at 13:03

## 2019-12-12 RX ADMIN — HYDROMORPHONE HYDROCHLORIDE 0.5 MG: 1 INJECTION, SOLUTION INTRAMUSCULAR; INTRAVENOUS; SUBCUTANEOUS at 19:21

## 2019-12-12 RX ADMIN — LIDOCAINE HYDROCHLORIDE 60 MG: 20 INJECTION, SOLUTION INTRAVENOUS at 12:43

## 2019-12-12 RX ADMIN — VECURONIUM BROMIDE FOR INJECTION 1 MG: 1 INJECTION, POWDER, LYOPHILIZED, FOR SOLUTION INTRAVENOUS at 14:45

## 2019-12-12 RX ADMIN — FENTANYL CITRATE 50 MCG: 50 INJECTION, SOLUTION INTRAMUSCULAR; INTRAVENOUS at 13:36

## 2019-12-12 RX ADMIN — SODIUM CHLORIDE, PRESERVATIVE FREE 10 ML: 5 INJECTION INTRAVENOUS at 22:23

## 2019-12-12 RX ADMIN — OXYCODONE HYDROCHLORIDE AND ACETAMINOPHEN 2 TABLET: 5; 325 TABLET ORAL at 21:40

## 2019-12-12 RX ADMIN — PROPOFOL 150 MG: 10 INJECTION, EMULSION INTRAVENOUS at 12:43

## 2019-12-12 RX ADMIN — LATANOPROST 1 DROP: 50 SOLUTION OPHTHALMIC at 22:14

## 2019-12-12 RX ADMIN — ONDANSETRON HYDROCHLORIDE 4 MG: 2 INJECTION, SOLUTION INTRAMUSCULAR; INTRAVENOUS at 17:06

## 2019-12-12 RX ADMIN — VECURONIUM BROMIDE FOR INJECTION 1 MG: 1 INJECTION, POWDER, LYOPHILIZED, FOR SOLUTION INTRAVENOUS at 15:27

## 2019-12-12 RX ADMIN — TIMOLOL MALEATE 1 DROP: 5 SOLUTION OPHTHALMIC at 22:11

## 2019-12-12 RX ADMIN — TIMOLOL MALEATE 1 DROP: 5 SOLUTION OPHTHALMIC at 09:15

## 2019-12-12 RX ADMIN — CEFAZOLIN 2000 MG: 1 INJECTION, POWDER, FOR SOLUTION INTRAMUSCULAR; INTRAVENOUS at 13:10

## 2019-12-12 RX ADMIN — PROPOFOL 50 MG: 10 INJECTION, EMULSION INTRAVENOUS at 14:25

## 2019-12-12 RX ADMIN — VECURONIUM BROMIDE FOR INJECTION 6 MG: 1 INJECTION, POWDER, LYOPHILIZED, FOR SOLUTION INTRAVENOUS at 12:43

## 2019-12-12 RX ADMIN — FENTANYL CITRATE 50 MCG: 50 INJECTION, SOLUTION INTRAMUSCULAR; INTRAVENOUS at 16:21

## 2019-12-12 RX ADMIN — DIAZEPAM 5 MG: 5 TABLET ORAL at 22:18

## 2019-12-12 RX ADMIN — FENTANYL CITRATE 100 MCG: 50 INJECTION, SOLUTION INTRAMUSCULAR; INTRAVENOUS at 12:43

## 2019-12-12 RX ADMIN — Medication 3 MG: at 18:02

## 2019-12-12 RX ADMIN — HYDROMORPHONE HYDROCHLORIDE 0.5 MG: 1 INJECTION, SOLUTION INTRAMUSCULAR; INTRAVENOUS; SUBCUTANEOUS at 19:11

## 2019-12-12 RX ADMIN — LOTEPREDNOL ETABONATE 1 DROP: 5 SUSPENSION/ DROPS OPHTHALMIC at 22:20

## 2019-12-12 RX ADMIN — BRIMONIDINE TARTRATE 1 DROP: 2 SOLUTION/ DROPS OPHTHALMIC at 22:12

## 2019-12-12 RX ADMIN — PHENYLEPHRINE HYDROCHLORIDE 100 MCG: 10 INJECTION INTRAVENOUS at 12:58

## 2019-12-12 RX ADMIN — DOXEPIN HYDROCHLORIDE 50 MG: 50 CAPSULE ORAL at 22:18

## 2019-12-12 RX ADMIN — DOCUSATE SODIUM 100 MG: 100 CAPSULE, LIQUID FILLED ORAL at 22:17

## 2019-12-12 RX ADMIN — HYDROMORPHONE HYDROCHLORIDE 0.5 MG: 1 INJECTION, SOLUTION INTRAMUSCULAR; INTRAVENOUS; SUBCUTANEOUS at 19:16

## 2019-12-12 RX ADMIN — SODIUM CHLORIDE, POTASSIUM CHLORIDE, SODIUM LACTATE AND CALCIUM CHLORIDE: 600; 310; 30; 20 INJECTION, SOLUTION INTRAVENOUS at 12:57

## 2019-12-12 RX ADMIN — FENTANYL CITRATE 20 MCG: 50 INJECTION, SOLUTION INTRAMUSCULAR; INTRAVENOUS at 17:55

## 2019-12-12 RX ADMIN — CEFAZOLIN SODIUM 2 G: 2 SOLUTION INTRAVENOUS at 22:17

## 2019-12-12 RX ADMIN — FENTANYL CITRATE 100 MCG: 50 INJECTION, SOLUTION INTRAMUSCULAR; INTRAVENOUS at 14:52

## 2019-12-12 RX ADMIN — SENNOSIDES AND DOCUSATE SODIUM 1 TABLET: 8.6; 5 TABLET ORAL at 22:17

## 2019-12-12 RX ADMIN — DEXAMETHASONE SODIUM PHOSPHATE 10 MG: 10 INJECTION INTRAMUSCULAR; INTRAVENOUS at 12:43

## 2019-12-12 RX ADMIN — CEFAZOLIN 2000 MG: 1 INJECTION, POWDER, FOR SOLUTION INTRAMUSCULAR; INTRAVENOUS at 16:58

## 2019-12-12 RX ADMIN — VECURONIUM BROMIDE FOR INJECTION 1 MG: 1 INJECTION, POWDER, LYOPHILIZED, FOR SOLUTION INTRAVENOUS at 14:14

## 2019-12-12 RX ADMIN — VECURONIUM BROMIDE FOR INJECTION 1 MG: 1 INJECTION, POWDER, LYOPHILIZED, FOR SOLUTION INTRAVENOUS at 15:56

## 2019-12-12 RX ADMIN — Medication 0.6 MG: at 18:02

## 2019-12-12 RX ADMIN — OMEGA-3-ACID ETHYL ESTERS 2 G: 1 CAPSULE, LIQUID FILLED ORAL at 22:18

## 2019-12-12 RX ADMIN — HYDROMORPHONE HYDROCHLORIDE 0.5 MG: 1 INJECTION, SOLUTION INTRAMUSCULAR; INTRAVENOUS; SUBCUTANEOUS at 19:06

## 2019-12-12 RX ADMIN — SODIUM CHLORIDE: 9 INJECTION, SOLUTION INTRAVENOUS at 00:08

## 2019-12-12 RX ADMIN — MIDAZOLAM 1 MG: 1 INJECTION INTRAMUSCULAR; INTRAVENOUS at 12:36

## 2019-12-12 RX ADMIN — BRIMONIDINE TARTRATE 1 DROP: 2 SOLUTION/ DROPS OPHTHALMIC at 09:14

## 2019-12-12 RX ADMIN — SODIUM CHLORIDE, PRESERVATIVE FREE 10 ML: 5 INJECTION INTRAVENOUS at 09:19

## 2019-12-12 ASSESSMENT — PULMONARY FUNCTION TESTS
PIF_VALUE: 22
PIF_VALUE: 23
PIF_VALUE: 22
PIF_VALUE: 24
PIF_VALUE: 24
PIF_VALUE: 16
PIF_VALUE: 26
PIF_VALUE: 23
PIF_VALUE: 24
PIF_VALUE: 2
PIF_VALUE: 27
PIF_VALUE: 23
PIF_VALUE: 18
PIF_VALUE: 23
PIF_VALUE: 22
PIF_VALUE: 24
PIF_VALUE: 22
PIF_VALUE: 18
PIF_VALUE: 22
PIF_VALUE: 23
PIF_VALUE: 24
PIF_VALUE: 21
PIF_VALUE: 23
PIF_VALUE: 16
PIF_VALUE: 25
PIF_VALUE: 22
PIF_VALUE: 21
PIF_VALUE: 16
PIF_VALUE: 18
PIF_VALUE: 3
PIF_VALUE: 25
PIF_VALUE: 24
PIF_VALUE: 23
PIF_VALUE: 16
PIF_VALUE: 23
PIF_VALUE: 21
PIF_VALUE: 16
PIF_VALUE: 25
PIF_VALUE: 23
PIF_VALUE: 18
PIF_VALUE: 18
PIF_VALUE: 25
PIF_VALUE: 22
PIF_VALUE: 24
PIF_VALUE: 22
PIF_VALUE: 24
PIF_VALUE: 22
PIF_VALUE: 22
PIF_VALUE: 4
PIF_VALUE: 23
PIF_VALUE: 22
PIF_VALUE: 13
PIF_VALUE: 25
PIF_VALUE: 25
PIF_VALUE: 23
PIF_VALUE: 18
PIF_VALUE: 23
PIF_VALUE: 22
PIF_VALUE: 23
PIF_VALUE: 18
PIF_VALUE: 21
PIF_VALUE: 24
PIF_VALUE: 16
PIF_VALUE: 16
PIF_VALUE: 23
PIF_VALUE: 18
PIF_VALUE: 7
PIF_VALUE: 23
PIF_VALUE: 20
PIF_VALUE: 22
PIF_VALUE: 23
PIF_VALUE: 21
PIF_VALUE: 24
PIF_VALUE: 22
PIF_VALUE: 18
PIF_VALUE: 21
PIF_VALUE: 23
PIF_VALUE: 22
PIF_VALUE: 23
PIF_VALUE: 22
PIF_VALUE: 25
PIF_VALUE: 23
PIF_VALUE: 24
PIF_VALUE: 23
PIF_VALUE: 20
PIF_VALUE: 22
PIF_VALUE: 22
PIF_VALUE: 24
PIF_VALUE: 23
PIF_VALUE: 22
PIF_VALUE: 22
PIF_VALUE: 23
PIF_VALUE: 22
PIF_VALUE: 20
PIF_VALUE: 23
PIF_VALUE: 22
PIF_VALUE: 23
PIF_VALUE: 22
PIF_VALUE: 24
PIF_VALUE: 25
PIF_VALUE: 23
PIF_VALUE: 22
PIF_VALUE: 23
PIF_VALUE: 22
PIF_VALUE: 23
PIF_VALUE: 25
PIF_VALUE: 23
PIF_VALUE: 24
PIF_VALUE: 23
PIF_VALUE: 21
PIF_VALUE: 16
PIF_VALUE: 22
PIF_VALUE: 18
PIF_VALUE: 0
PIF_VALUE: 18
PIF_VALUE: 25
PIF_VALUE: 22
PIF_VALUE: 22
PIF_VALUE: 24
PIF_VALUE: 22
PIF_VALUE: 23
PIF_VALUE: 23
PIF_VALUE: 22
PIF_VALUE: 22
PIF_VALUE: 23
PIF_VALUE: 23
PIF_VALUE: 25
PIF_VALUE: 22
PIF_VALUE: 22
PIF_VALUE: 2
PIF_VALUE: 24
PIF_VALUE: 23
PIF_VALUE: 23
PIF_VALUE: 21
PIF_VALUE: 12
PIF_VALUE: 23
PIF_VALUE: 22
PIF_VALUE: 23
PIF_VALUE: 22
PIF_VALUE: 16
PIF_VALUE: 23
PIF_VALUE: 16
PIF_VALUE: 3
PIF_VALUE: 8
PIF_VALUE: 25
PIF_VALUE: 23
PIF_VALUE: 22
PIF_VALUE: 21
PIF_VALUE: 16
PIF_VALUE: 23
PIF_VALUE: 22
PIF_VALUE: 22
PIF_VALUE: 18
PIF_VALUE: 24
PIF_VALUE: 22
PIF_VALUE: 22
PIF_VALUE: 23
PIF_VALUE: 23
PIF_VALUE: 24
PIF_VALUE: 24
PIF_VALUE: 23
PIF_VALUE: 20
PIF_VALUE: 22
PIF_VALUE: 22
PIF_VALUE: 25
PIF_VALUE: 23
PIF_VALUE: 23
PIF_VALUE: 18
PIF_VALUE: 22
PIF_VALUE: 23
PIF_VALUE: 23
PIF_VALUE: 16
PIF_VALUE: 25
PIF_VALUE: 24
PIF_VALUE: 24
PIF_VALUE: 25
PIF_VALUE: 23
PIF_VALUE: 18
PIF_VALUE: 23
PIF_VALUE: 25
PIF_VALUE: 24
PIF_VALUE: 22
PIF_VALUE: 13
PIF_VALUE: 18
PIF_VALUE: 22
PIF_VALUE: 22
PIF_VALUE: 21
PIF_VALUE: 22
PIF_VALUE: 23
PIF_VALUE: 23
PIF_VALUE: 25
PIF_VALUE: 24
PIF_VALUE: 23
PIF_VALUE: 23
PIF_VALUE: 3
PIF_VALUE: 24
PIF_VALUE: 23
PIF_VALUE: 20
PIF_VALUE: 23
PIF_VALUE: 25
PIF_VALUE: 21
PIF_VALUE: 22
PIF_VALUE: 23
PIF_VALUE: 0
PIF_VALUE: 25
PIF_VALUE: 23
PIF_VALUE: 17
PIF_VALUE: 21
PIF_VALUE: 23
PIF_VALUE: 18
PIF_VALUE: 25
PIF_VALUE: 23
PIF_VALUE: 21
PIF_VALUE: 24
PIF_VALUE: 25
PIF_VALUE: 18
PIF_VALUE: 18
PIF_VALUE: 21
PIF_VALUE: 18
PIF_VALUE: 23
PIF_VALUE: 22
PIF_VALUE: 21
PIF_VALUE: 16
PIF_VALUE: 20
PIF_VALUE: 24
PIF_VALUE: 22
PIF_VALUE: 23
PIF_VALUE: 24
PIF_VALUE: 23
PIF_VALUE: 21
PIF_VALUE: 22
PIF_VALUE: 23
PIF_VALUE: 22
PIF_VALUE: 23
PIF_VALUE: 16
PIF_VALUE: 17
PIF_VALUE: 16
PIF_VALUE: 24
PIF_VALUE: 23
PIF_VALUE: 22
PIF_VALUE: 23
PIF_VALUE: 22
PIF_VALUE: 23
PIF_VALUE: 23
PIF_VALUE: 24
PIF_VALUE: 23
PIF_VALUE: 26
PIF_VALUE: 20
PIF_VALUE: 22
PIF_VALUE: 23
PIF_VALUE: 21
PIF_VALUE: 22
PIF_VALUE: 21
PIF_VALUE: 18
PIF_VALUE: 21
PIF_VALUE: 1
PIF_VALUE: 23
PIF_VALUE: 24
PIF_VALUE: 22
PIF_VALUE: 18
PIF_VALUE: 25
PIF_VALUE: 18
PIF_VALUE: 22
PIF_VALUE: 23
PIF_VALUE: 23
PIF_VALUE: 25
PIF_VALUE: 22
PIF_VALUE: 24
PIF_VALUE: 22
PIF_VALUE: 23
PIF_VALUE: 0
PIF_VALUE: 22
PIF_VALUE: 18
PIF_VALUE: 21
PIF_VALUE: 24
PIF_VALUE: 21
PIF_VALUE: 21
PIF_VALUE: 24
PIF_VALUE: 23
PIF_VALUE: 18
PIF_VALUE: 23
PIF_VALUE: 24
PIF_VALUE: 0
PIF_VALUE: 21
PIF_VALUE: 25
PIF_VALUE: 23
PIF_VALUE: 22
PIF_VALUE: 22
PIF_VALUE: 25
PIF_VALUE: 13
PIF_VALUE: 23
PIF_VALUE: 22
PIF_VALUE: 17
PIF_VALUE: 23
PIF_VALUE: 24
PIF_VALUE: 17
PIF_VALUE: 23
PIF_VALUE: 22
PIF_VALUE: 22
PIF_VALUE: 20
PIF_VALUE: 21
PIF_VALUE: 23
PIF_VALUE: 22
PIF_VALUE: 22
PIF_VALUE: 23
PIF_VALUE: 24
PIF_VALUE: 22
PIF_VALUE: 21
PIF_VALUE: 23
PIF_VALUE: 22
PIF_VALUE: 23
PIF_VALUE: 23
PIF_VALUE: 24
PIF_VALUE: 22
PIF_VALUE: 23
PIF_VALUE: 18
PIF_VALUE: 18

## 2019-12-12 ASSESSMENT — PAIN SCALES - GENERAL
PAINLEVEL_OUTOF10: 0
PAINLEVEL_OUTOF10: 7
PAINLEVEL_OUTOF10: 4
PAINLEVEL_OUTOF10: 0
PAINLEVEL_OUTOF10: 7
PAINLEVEL_OUTOF10: 6
PAINLEVEL_OUTOF10: 7
PAINLEVEL_OUTOF10: 7

## 2019-12-12 ASSESSMENT — PAIN DESCRIPTION - LOCATION
LOCATION: BACK

## 2019-12-12 ASSESSMENT — PAIN DESCRIPTION - PAIN TYPE
TYPE: SURGICAL PAIN
TYPE: ACUTE PAIN

## 2019-12-12 ASSESSMENT — PAIN DESCRIPTION - ORIENTATION
ORIENTATION: MID
ORIENTATION: LOWER

## 2019-12-12 ASSESSMENT — PAIN DESCRIPTION - FREQUENCY: FREQUENCY: CONTINUOUS

## 2019-12-12 ASSESSMENT — PAIN DESCRIPTION - ONSET: ONSET: ON-GOING

## 2019-12-12 ASSESSMENT — PAIN DESCRIPTION - DESCRIPTORS
DESCRIPTORS: BURNING;DULL
DESCRIPTORS: BURNING;CONSTANT
DESCRIPTORS: ACHING;BURNING
DESCRIPTORS: ACHING;BURNING
DESCRIPTORS: ACHING;SHARP;SORE

## 2019-12-12 ASSESSMENT — PAIN - FUNCTIONAL ASSESSMENT: PAIN_FUNCTIONAL_ASSESSMENT: PREVENTS OR INTERFERES SOME ACTIVE ACTIVITIES AND ADLS

## 2019-12-12 ASSESSMENT — PAIN DESCRIPTION - PROGRESSION: CLINICAL_PROGRESSION: NOT CHANGED

## 2019-12-13 ENCOUNTER — APPOINTMENT (OUTPATIENT)
Dept: GENERAL RADIOLOGY | Age: 79
DRG: 460 | End: 2019-12-13
Attending: NEUROLOGICAL SURGERY
Payer: MEDICARE

## 2019-12-13 LAB
ANION GAP SERPL CALCULATED.3IONS-SCNC: 11 MMOL/L (ref 7–16)
BASOPHILS ABSOLUTE: 0.02 E9/L (ref 0–0.2)
BASOPHILS RELATIVE PERCENT: 0.2 % (ref 0–2)
BUN BLDV-MCNC: 17 MG/DL (ref 8–23)
CALCIUM SERPL-MCNC: 8.6 MG/DL (ref 8.6–10.2)
CHLORIDE BLD-SCNC: 103 MMOL/L (ref 98–107)
CO2: 23 MMOL/L (ref 22–29)
CREAT SERPL-MCNC: 0.7 MG/DL (ref 0.5–1)
EOSINOPHILS ABSOLUTE: 0 E9/L (ref 0.05–0.5)
EOSINOPHILS RELATIVE PERCENT: 0 % (ref 0–6)
GFR AFRICAN AMERICAN: >60
GFR NON-AFRICAN AMERICAN: >60 ML/MIN/1.73
GLUCOSE BLD-MCNC: 100 MG/DL (ref 74–99)
HCT VFR BLD CALC: 32.9 % (ref 34–48)
HEMOGLOBIN: 10.3 G/DL (ref 11.5–15.5)
IMMATURE GRANULOCYTES #: 0.06 E9/L
IMMATURE GRANULOCYTES %: 0.5 % (ref 0–5)
LYMPHOCYTES ABSOLUTE: 1.37 E9/L (ref 1.5–4)
LYMPHOCYTES RELATIVE PERCENT: 11.8 % (ref 20–42)
MCH RBC QN AUTO: 28.6 PG (ref 26–35)
MCHC RBC AUTO-ENTMCNC: 31.3 % (ref 32–34.5)
MCV RBC AUTO: 91.4 FL (ref 80–99.9)
MONOCYTES ABSOLUTE: 1 E9/L (ref 0.1–0.95)
MONOCYTES RELATIVE PERCENT: 8.6 % (ref 2–12)
NEUTROPHILS ABSOLUTE: 9.14 E9/L (ref 1.8–7.3)
NEUTROPHILS RELATIVE PERCENT: 78.9 % (ref 43–80)
PDW BLD-RTO: 13.2 FL (ref 11.5–15)
PLATELET # BLD: 320 E9/L (ref 130–450)
PMV BLD AUTO: 8.9 FL (ref 7–12)
POTASSIUM REFLEX MAGNESIUM: 4.3 MMOL/L (ref 3.5–5)
RBC # BLD: 3.6 E12/L (ref 3.5–5.5)
SODIUM BLD-SCNC: 137 MMOL/L (ref 132–146)
WBC # BLD: 11.6 E9/L (ref 4.5–11.5)

## 2019-12-13 PROCEDURE — 2580000003 HC RX 258: Performed by: PHYSICIAN ASSISTANT

## 2019-12-13 PROCEDURE — 97530 THERAPEUTIC ACTIVITIES: CPT

## 2019-12-13 PROCEDURE — 72080 X-RAY EXAM THORACOLMB 2/> VW: CPT

## 2019-12-13 PROCEDURE — 97161 PT EVAL LOW COMPLEX 20 MIN: CPT | Performed by: PHYSICAL THERAPIST

## 2019-12-13 PROCEDURE — 6370000000 HC RX 637 (ALT 250 FOR IP): Performed by: PHYSICIAN ASSISTANT

## 2019-12-13 PROCEDURE — 1200000000 HC SEMI PRIVATE

## 2019-12-13 PROCEDURE — 85025 COMPLETE CBC W/AUTO DIFF WBC: CPT

## 2019-12-13 PROCEDURE — 6360000002 HC RX W HCPCS: Performed by: PHYSICIAN ASSISTANT

## 2019-12-13 PROCEDURE — 97166 OT EVAL MOD COMPLEX 45 MIN: CPT

## 2019-12-13 PROCEDURE — 80048 BASIC METABOLIC PNL TOTAL CA: CPT

## 2019-12-13 PROCEDURE — 97530 THERAPEUTIC ACTIVITIES: CPT | Performed by: PHYSICAL THERAPIST

## 2019-12-13 PROCEDURE — 36415 COLL VENOUS BLD VENIPUNCTURE: CPT

## 2019-12-13 RX ORDER — DIAZEPAM 5 MG/1
5 TABLET ORAL EVERY 6 HOURS PRN
Qty: 28 TABLET | Refills: 0 | Status: SHIPPED | OUTPATIENT
Start: 2019-12-13 | End: 2019-12-20

## 2019-12-13 RX ORDER — OXYCODONE HYDROCHLORIDE AND ACETAMINOPHEN 5; 325 MG/1; MG/1
1 TABLET ORAL EVERY 4 HOURS PRN
Qty: 42 TABLET | Refills: 0 | Status: SHIPPED | OUTPATIENT
Start: 2019-12-13 | End: 2019-12-20

## 2019-12-13 RX ORDER — PSEUDOEPHEDRINE HCL 30 MG
100 TABLET ORAL 2 TIMES DAILY PRN
Qty: 60 CAPSULE | Refills: 0 | Status: SHIPPED | OUTPATIENT
Start: 2019-12-13

## 2019-12-13 RX ADMIN — SODIUM CHLORIDE: 9 INJECTION, SOLUTION INTRAVENOUS at 13:48

## 2019-12-13 RX ADMIN — SODIUM CHLORIDE, PRESERVATIVE FREE 10 ML: 5 INJECTION INTRAVENOUS at 20:44

## 2019-12-13 RX ADMIN — DOCUSATE SODIUM 100 MG: 100 CAPSULE, LIQUID FILLED ORAL at 20:44

## 2019-12-13 RX ADMIN — OXYCODONE HYDROCHLORIDE AND ACETAMINOPHEN 2 TABLET: 5; 325 TABLET ORAL at 13:53

## 2019-12-13 RX ADMIN — LOTEPREDNOL ETABONATE 1 DROP: 5 SUSPENSION/ DROPS OPHTHALMIC at 20:44

## 2019-12-13 RX ADMIN — OMEGA-3-ACID ETHYL ESTERS 2 G: 1 CAPSULE, LIQUID FILLED ORAL at 20:44

## 2019-12-13 RX ADMIN — BRIMONIDINE TARTRATE 1 DROP: 2 SOLUTION/ DROPS OPHTHALMIC at 20:57

## 2019-12-13 RX ADMIN — TIMOLOL MALEATE 1 DROP: 5 SOLUTION OPHTHALMIC at 20:58

## 2019-12-13 RX ADMIN — CEFAZOLIN SODIUM 2 G: 2 SOLUTION INTRAVENOUS at 05:14

## 2019-12-13 RX ADMIN — DOXEPIN HYDROCHLORIDE 50 MG: 50 CAPSULE ORAL at 13:49

## 2019-12-13 RX ADMIN — SENNOSIDES AND DOCUSATE SODIUM 1 TABLET: 8.6; 5 TABLET ORAL at 09:44

## 2019-12-13 RX ADMIN — MAGNESIUM HYDROXIDE 30 ML: 400 SUSPENSION ORAL at 20:43

## 2019-12-13 RX ADMIN — OMEGA-3-ACID ETHYL ESTERS 2 G: 1 CAPSULE, LIQUID FILLED ORAL at 09:45

## 2019-12-13 RX ADMIN — TIMOLOL MALEATE 1 DROP: 5 SOLUTION OPHTHALMIC at 09:48

## 2019-12-13 RX ADMIN — DOXEPIN HYDROCHLORIDE 50 MG: 50 CAPSULE ORAL at 09:45

## 2019-12-13 RX ADMIN — SODIUM CHLORIDE, PRESERVATIVE FREE 10 ML: 5 INJECTION INTRAVENOUS at 00:48

## 2019-12-13 RX ADMIN — CEFAZOLIN SODIUM 2 G: 2 SOLUTION INTRAVENOUS at 13:48

## 2019-12-13 RX ADMIN — SODIUM CHLORIDE, PRESERVATIVE FREE 10 ML: 5 INJECTION INTRAVENOUS at 09:52

## 2019-12-13 RX ADMIN — DOXEPIN HYDROCHLORIDE 50 MG: 50 CAPSULE ORAL at 20:44

## 2019-12-13 RX ADMIN — CETIRIZINE HYDROCHLORIDE 10 MG: 10 TABLET, FILM COATED ORAL at 20:53

## 2019-12-13 RX ADMIN — SODIUM CHLORIDE, PRESERVATIVE FREE 10 ML: 5 INJECTION INTRAVENOUS at 09:50

## 2019-12-13 RX ADMIN — LOTEPREDNOL ETABONATE 1 DROP: 5 SUSPENSION/ DROPS OPHTHALMIC at 09:47

## 2019-12-13 RX ADMIN — ROSUVASTATIN CALCIUM 10 MG: 10 TABLET, FILM COATED ORAL at 09:46

## 2019-12-13 RX ADMIN — HYDROMORPHONE HYDROCHLORIDE 0.5 MG: 1 INJECTION, SOLUTION INTRAMUSCULAR; INTRAVENOUS; SUBCUTANEOUS at 04:06

## 2019-12-13 RX ADMIN — SENNOSIDES AND DOCUSATE SODIUM 1 TABLET: 8.6; 5 TABLET ORAL at 20:43

## 2019-12-13 RX ADMIN — HYDROMORPHONE HYDROCHLORIDE 0.5 MG: 1 INJECTION, SOLUTION INTRAMUSCULAR; INTRAVENOUS; SUBCUTANEOUS at 00:48

## 2019-12-13 RX ADMIN — OXYCODONE HYDROCHLORIDE AND ACETAMINOPHEN 2 TABLET: 5; 325 TABLET ORAL at 19:43

## 2019-12-13 RX ADMIN — LATANOPROST 1 DROP: 50 SOLUTION OPHTHALMIC at 21:01

## 2019-12-13 RX ADMIN — MINERAL SUPPLEMENT IRON 300 MG / 5 ML STRENGTH LIQUID 100 PER BOX UNFLAVORED 300 MG: at 09:45

## 2019-12-13 RX ADMIN — DIAZEPAM 5 MG: 5 TABLET ORAL at 19:43

## 2019-12-13 RX ADMIN — OXYCODONE HYDROCHLORIDE AND ACETAMINOPHEN 2 TABLET: 5; 325 TABLET ORAL at 02:10

## 2019-12-13 RX ADMIN — DOCUSATE SODIUM 100 MG: 100 CAPSULE, LIQUID FILLED ORAL at 09:44

## 2019-12-13 RX ADMIN — LEVOTHYROXINE SODIUM 50 MCG: 50 TABLET ORAL at 06:53

## 2019-12-13 RX ADMIN — OXYCODONE HYDROCHLORIDE AND ACETAMINOPHEN 1 TABLET: 5; 325 TABLET ORAL at 06:53

## 2019-12-13 RX ADMIN — BRIMONIDINE TARTRATE 1 DROP: 2 SOLUTION/ DROPS OPHTHALMIC at 09:48

## 2019-12-13 RX ADMIN — DOXEPIN HYDROCHLORIDE 50 MG: 50 CAPSULE ORAL at 16:32

## 2019-12-13 RX ADMIN — CETIRIZINE HYDROCHLORIDE 10 MG: 10 TABLET, FILM COATED ORAL at 09:45

## 2019-12-13 RX ADMIN — CEFAZOLIN SODIUM 2 G: 2 SOLUTION INTRAVENOUS at 20:47

## 2019-12-13 ASSESSMENT — PAIN DESCRIPTION - DESCRIPTORS
DESCRIPTORS: ACHING;DISCOMFORT;SORE
DESCRIPTORS: ACHING;DISCOMFORT;DULL

## 2019-12-13 ASSESSMENT — PAIN SCALES - GENERAL
PAINLEVEL_OUTOF10: 2
PAINLEVEL_OUTOF10: 7
PAINLEVEL_OUTOF10: 6
PAINLEVEL_OUTOF10: 0
PAINLEVEL_OUTOF10: 0
PAINLEVEL_OUTOF10: 4
PAINLEVEL_OUTOF10: 5
PAINLEVEL_OUTOF10: 4
PAINLEVEL_OUTOF10: 2
PAINLEVEL_OUTOF10: 7
PAINLEVEL_OUTOF10: 7
PAINLEVEL_OUTOF10: 0
PAINLEVEL_OUTOF10: 0

## 2019-12-13 ASSESSMENT — PAIN DESCRIPTION - FREQUENCY: FREQUENCY: CONTINUOUS

## 2019-12-13 ASSESSMENT — PAIN DESCRIPTION - LOCATION
LOCATION: BACK
LOCATION: BACK

## 2019-12-13 ASSESSMENT — PAIN DESCRIPTION - ORIENTATION
ORIENTATION: LOWER
ORIENTATION: MID;LOWER

## 2019-12-13 ASSESSMENT — PAIN DESCRIPTION - PAIN TYPE
TYPE: SURGICAL PAIN
TYPE: ACUTE PAIN

## 2019-12-13 ASSESSMENT — PAIN DESCRIPTION - PROGRESSION: CLINICAL_PROGRESSION: NOT CHANGED

## 2019-12-13 ASSESSMENT — PAIN DESCRIPTION - ONSET: ONSET: ON-GOING

## 2019-12-14 PROCEDURE — 97530 THERAPEUTIC ACTIVITIES: CPT

## 2019-12-14 PROCEDURE — 6370000000 HC RX 637 (ALT 250 FOR IP): Performed by: PHYSICIAN ASSISTANT

## 2019-12-14 PROCEDURE — 6360000002 HC RX W HCPCS: Performed by: PHYSICIAN ASSISTANT

## 2019-12-14 PROCEDURE — 2580000003 HC RX 258: Performed by: PHYSICIAN ASSISTANT

## 2019-12-14 PROCEDURE — 1200000000 HC SEMI PRIVATE

## 2019-12-14 RX ADMIN — DOCUSATE SODIUM 100 MG: 100 CAPSULE, LIQUID FILLED ORAL at 22:52

## 2019-12-14 RX ADMIN — DOXEPIN HYDROCHLORIDE 50 MG: 50 CAPSULE ORAL at 15:01

## 2019-12-14 RX ADMIN — BRIMONIDINE TARTRATE 1 DROP: 2 SOLUTION/ DROPS OPHTHALMIC at 09:52

## 2019-12-14 RX ADMIN — CETIRIZINE HYDROCHLORIDE 10 MG: 10 TABLET, FILM COATED ORAL at 22:52

## 2019-12-14 RX ADMIN — OXYCODONE HYDROCHLORIDE AND ACETAMINOPHEN 2 TABLET: 5; 325 TABLET ORAL at 03:31

## 2019-12-14 RX ADMIN — TIMOLOL MALEATE 1 DROP: 5 SOLUTION OPHTHALMIC at 09:52

## 2019-12-14 RX ADMIN — BRIMONIDINE TARTRATE 1 DROP: 2 SOLUTION/ DROPS OPHTHALMIC at 22:51

## 2019-12-14 RX ADMIN — DIAZEPAM 5 MG: 5 TABLET ORAL at 03:31

## 2019-12-14 RX ADMIN — DOXEPIN HYDROCHLORIDE 50 MG: 50 CAPSULE ORAL at 18:29

## 2019-12-14 RX ADMIN — CEFAZOLIN SODIUM 2 G: 2 SOLUTION INTRAVENOUS at 03:31

## 2019-12-14 RX ADMIN — ROSUVASTATIN CALCIUM 10 MG: 10 TABLET, FILM COATED ORAL at 09:52

## 2019-12-14 RX ADMIN — OMEGA-3-ACID ETHYL ESTERS 2 G: 1 CAPSULE, LIQUID FILLED ORAL at 22:51

## 2019-12-14 RX ADMIN — LOTEPREDNOL ETABONATE 1 DROP: 5 SUSPENSION/ DROPS OPHTHALMIC at 09:52

## 2019-12-14 RX ADMIN — LOTEPREDNOL ETABONATE 1 DROP: 5 SUSPENSION/ DROPS OPHTHALMIC at 22:51

## 2019-12-14 RX ADMIN — OMEGA-3-ACID ETHYL ESTERS 2 G: 1 CAPSULE, LIQUID FILLED ORAL at 09:55

## 2019-12-14 RX ADMIN — DOXEPIN HYDROCHLORIDE 50 MG: 50 CAPSULE ORAL at 22:52

## 2019-12-14 RX ADMIN — DOXEPIN HYDROCHLORIDE 50 MG: 50 CAPSULE ORAL at 09:53

## 2019-12-14 RX ADMIN — DIAZEPAM 5 MG: 5 TABLET ORAL at 09:53

## 2019-12-14 RX ADMIN — SENNOSIDES AND DOCUSATE SODIUM 1 TABLET: 8.6; 5 TABLET ORAL at 09:53

## 2019-12-14 RX ADMIN — DOCUSATE SODIUM 100 MG: 100 CAPSULE, LIQUID FILLED ORAL at 09:52

## 2019-12-14 RX ADMIN — LATANOPROST 1 DROP: 50 SOLUTION OPHTHALMIC at 22:55

## 2019-12-14 RX ADMIN — SODIUM CHLORIDE, PRESERVATIVE FREE 10 ML: 5 INJECTION INTRAVENOUS at 09:53

## 2019-12-14 RX ADMIN — OXYCODONE HYDROCHLORIDE AND ACETAMINOPHEN 2 TABLET: 5; 325 TABLET ORAL at 18:30

## 2019-12-14 RX ADMIN — SENNOSIDES AND DOCUSATE SODIUM 1 TABLET: 8.6; 5 TABLET ORAL at 22:52

## 2019-12-14 RX ADMIN — CEFAZOLIN SODIUM 2 G: 2 SOLUTION INTRAVENOUS at 15:02

## 2019-12-14 RX ADMIN — OXYCODONE HYDROCHLORIDE AND ACETAMINOPHEN 2 TABLET: 5; 325 TABLET ORAL at 09:53

## 2019-12-14 RX ADMIN — SODIUM CHLORIDE, PRESERVATIVE FREE 10 ML: 5 INJECTION INTRAVENOUS at 22:54

## 2019-12-14 RX ADMIN — LEVOTHYROXINE SODIUM 50 MCG: 50 TABLET ORAL at 06:24

## 2019-12-14 RX ADMIN — MINERAL SUPPLEMENT IRON 300 MG / 5 ML STRENGTH LIQUID 100 PER BOX UNFLAVORED 300 MG: at 09:51

## 2019-12-14 RX ADMIN — TIMOLOL MALEATE 1 DROP: 5 SOLUTION OPHTHALMIC at 22:51

## 2019-12-14 ASSESSMENT — PAIN DESCRIPTION - ONSET: ONSET: ON-GOING

## 2019-12-14 ASSESSMENT — PAIN SCALES - GENERAL
PAINLEVEL_OUTOF10: 7
PAINLEVEL_OUTOF10: 7
PAINLEVEL_OUTOF10: 3
PAINLEVEL_OUTOF10: 6
PAINLEVEL_OUTOF10: 3
PAINLEVEL_OUTOF10: 0

## 2019-12-14 ASSESSMENT — PAIN DESCRIPTION - DESCRIPTORS
DESCRIPTORS: ACHING;DISCOMFORT;SORE
DESCRIPTORS: ACHING;DISCOMFORT

## 2019-12-14 ASSESSMENT — PAIN DESCRIPTION - FREQUENCY: FREQUENCY: CONTINUOUS

## 2019-12-14 ASSESSMENT — PAIN - FUNCTIONAL ASSESSMENT: PAIN_FUNCTIONAL_ASSESSMENT: PREVENTS OR INTERFERES SOME ACTIVE ACTIVITIES AND ADLS

## 2019-12-14 ASSESSMENT — PAIN DESCRIPTION - LOCATION
LOCATION: BACK
LOCATION: BACK

## 2019-12-14 ASSESSMENT — PAIN DESCRIPTION - ORIENTATION: ORIENTATION: LOWER

## 2019-12-14 ASSESSMENT — PAIN DESCRIPTION - PROGRESSION: CLINICAL_PROGRESSION: NOT CHANGED

## 2019-12-14 ASSESSMENT — PAIN DESCRIPTION - PAIN TYPE
TYPE: SURGICAL PAIN
TYPE: SURGICAL PAIN

## 2019-12-15 PROCEDURE — 6370000000 HC RX 637 (ALT 250 FOR IP): Performed by: PHYSICIAN ASSISTANT

## 2019-12-15 PROCEDURE — 2580000003 HC RX 258: Performed by: PHYSICIAN ASSISTANT

## 2019-12-15 PROCEDURE — 6360000002 HC RX W HCPCS: Performed by: NEUROLOGICAL SURGERY

## 2019-12-15 PROCEDURE — 1200000000 HC SEMI PRIVATE

## 2019-12-15 RX ADMIN — DOXEPIN HYDROCHLORIDE 50 MG: 50 CAPSULE ORAL at 21:48

## 2019-12-15 RX ADMIN — OXYCODONE HYDROCHLORIDE AND ACETAMINOPHEN 1 TABLET: 5; 325 TABLET ORAL at 08:05

## 2019-12-15 RX ADMIN — LOTEPREDNOL ETABONATE 1 DROP: 5 SUSPENSION/ DROPS OPHTHALMIC at 08:05

## 2019-12-15 RX ADMIN — MINERAL SUPPLEMENT IRON 300 MG / 5 ML STRENGTH LIQUID 100 PER BOX UNFLAVORED 300 MG: at 08:08

## 2019-12-15 RX ADMIN — OXYCODONE HYDROCHLORIDE AND ACETAMINOPHEN 1 TABLET: 5; 325 TABLET ORAL at 18:42

## 2019-12-15 RX ADMIN — LOTEPREDNOL ETABONATE 1 DROP: 5 SUSPENSION/ DROPS OPHTHALMIC at 21:48

## 2019-12-15 RX ADMIN — SENNOSIDES AND DOCUSATE SODIUM 1 TABLET: 8.6; 5 TABLET ORAL at 21:47

## 2019-12-15 RX ADMIN — DOCUSATE SODIUM 100 MG: 100 CAPSULE, LIQUID FILLED ORAL at 21:47

## 2019-12-15 RX ADMIN — DOXEPIN HYDROCHLORIDE 50 MG: 50 CAPSULE ORAL at 13:27

## 2019-12-15 RX ADMIN — BRIMONIDINE TARTRATE 1 DROP: 2 SOLUTION/ DROPS OPHTHALMIC at 08:09

## 2019-12-15 RX ADMIN — ENOXAPARIN SODIUM 40 MG: 40 INJECTION SUBCUTANEOUS at 08:05

## 2019-12-15 RX ADMIN — BRIMONIDINE TARTRATE 1 DROP: 2 SOLUTION/ DROPS OPHTHALMIC at 21:48

## 2019-12-15 RX ADMIN — LEVOTHYROXINE SODIUM 50 MCG: 50 TABLET ORAL at 06:39

## 2019-12-15 RX ADMIN — OMEGA-3-ACID ETHYL ESTERS 2 G: 1 CAPSULE, LIQUID FILLED ORAL at 21:48

## 2019-12-15 RX ADMIN — ROSUVASTATIN CALCIUM 10 MG: 10 TABLET, FILM COATED ORAL at 08:08

## 2019-12-15 RX ADMIN — TIMOLOL MALEATE 1 DROP: 5 SOLUTION OPHTHALMIC at 21:48

## 2019-12-15 RX ADMIN — OXYCODONE HYDROCHLORIDE AND ACETAMINOPHEN 1 TABLET: 5; 325 TABLET ORAL at 13:19

## 2019-12-15 RX ADMIN — DOCUSATE SODIUM 100 MG: 100 CAPSULE, LIQUID FILLED ORAL at 08:05

## 2019-12-15 RX ADMIN — SODIUM CHLORIDE, PRESERVATIVE FREE 10 ML: 5 INJECTION INTRAVENOUS at 21:48

## 2019-12-15 RX ADMIN — DOXEPIN HYDROCHLORIDE 50 MG: 50 CAPSULE ORAL at 17:56

## 2019-12-15 RX ADMIN — MAGNESIUM HYDROXIDE 30 ML: 400 SUSPENSION ORAL at 13:27

## 2019-12-15 RX ADMIN — LATANOPROST 1 DROP: 50 SOLUTION OPHTHALMIC at 21:48

## 2019-12-15 RX ADMIN — CETIRIZINE HYDROCHLORIDE 10 MG: 10 TABLET, FILM COATED ORAL at 21:48

## 2019-12-15 RX ADMIN — DOXEPIN HYDROCHLORIDE 50 MG: 50 CAPSULE ORAL at 08:08

## 2019-12-15 RX ADMIN — SODIUM CHLORIDE, PRESERVATIVE FREE 10 ML: 5 INJECTION INTRAVENOUS at 08:05

## 2019-12-15 RX ADMIN — SENNOSIDES AND DOCUSATE SODIUM 1 TABLET: 8.6; 5 TABLET ORAL at 08:05

## 2019-12-15 RX ADMIN — TIMOLOL MALEATE 1 DROP: 5 SOLUTION OPHTHALMIC at 08:06

## 2019-12-15 RX ADMIN — OMEGA-3-ACID ETHYL ESTERS 2 G: 1 CAPSULE, LIQUID FILLED ORAL at 08:08

## 2019-12-15 ASSESSMENT — PAIN SCALES - GENERAL
PAINLEVEL_OUTOF10: 4
PAINLEVEL_OUTOF10: 2

## 2019-12-15 ASSESSMENT — PAIN DESCRIPTION - PAIN TYPE
TYPE: SURGICAL PAIN
TYPE: ACUTE PAIN;SURGICAL PAIN
TYPE: SURGICAL PAIN

## 2019-12-15 ASSESSMENT — PAIN DESCRIPTION - DESCRIPTORS
DESCRIPTORS: DISCOMFORT;ACHING;SORE;TENDER
DESCRIPTORS: SORE;TENDER;ACHING
DESCRIPTORS: ACHING;CONSTANT;THROBBING

## 2019-12-15 ASSESSMENT — PAIN DESCRIPTION - ONSET: ONSET: PROGRESSIVE

## 2019-12-15 ASSESSMENT — PAIN DESCRIPTION - LOCATION
LOCATION: BACK

## 2019-12-15 ASSESSMENT — PAIN DESCRIPTION - ORIENTATION
ORIENTATION: LOWER;MID
ORIENTATION: LEFT;LOWER;MID
ORIENTATION: LOWER;MID;LEFT

## 2019-12-15 ASSESSMENT — PAIN DESCRIPTION - PROGRESSION
CLINICAL_PROGRESSION: GRADUALLY IMPROVING
CLINICAL_PROGRESSION: GRADUALLY WORSENING
CLINICAL_PROGRESSION: NOT CHANGED

## 2019-12-15 ASSESSMENT — PAIN DESCRIPTION - FREQUENCY: FREQUENCY: CONTINUOUS

## 2019-12-15 ASSESSMENT — PAIN - FUNCTIONAL ASSESSMENT: PAIN_FUNCTIONAL_ASSESSMENT: PREVENTS OR INTERFERES SOME ACTIVE ACTIVITIES AND ADLS

## 2019-12-16 VITALS
TEMPERATURE: 97.9 F | HEIGHT: 63 IN | RESPIRATION RATE: 16 BRPM | HEART RATE: 81 BPM | WEIGHT: 140 LBS | DIASTOLIC BLOOD PRESSURE: 54 MMHG | SYSTOLIC BLOOD PRESSURE: 98 MMHG | OXYGEN SATURATION: 92 % | BODY MASS INDEX: 24.8 KG/M2

## 2019-12-16 PROCEDURE — 6370000000 HC RX 637 (ALT 250 FOR IP): Performed by: PHYSICIAN ASSISTANT

## 2019-12-16 PROCEDURE — 97530 THERAPEUTIC ACTIVITIES: CPT

## 2019-12-16 PROCEDURE — 2580000003 HC RX 258: Performed by: PHYSICIAN ASSISTANT

## 2019-12-16 PROCEDURE — 6360000002 HC RX W HCPCS: Performed by: NEUROLOGICAL SURGERY

## 2019-12-16 PROCEDURE — 97535 SELF CARE MNGMENT TRAINING: CPT

## 2019-12-16 RX ADMIN — LOTEPREDNOL ETABONATE 1 DROP: 5 SUSPENSION/ DROPS OPHTHALMIC at 08:31

## 2019-12-16 RX ADMIN — OXYCODONE HYDROCHLORIDE AND ACETAMINOPHEN 2 TABLET: 5; 325 TABLET ORAL at 02:04

## 2019-12-16 RX ADMIN — OXYCODONE HYDROCHLORIDE AND ACETAMINOPHEN 2 TABLET: 5; 325 TABLET ORAL at 06:40

## 2019-12-16 RX ADMIN — DOXEPIN HYDROCHLORIDE 50 MG: 50 CAPSULE ORAL at 08:31

## 2019-12-16 RX ADMIN — SENNOSIDES AND DOCUSATE SODIUM 1 TABLET: 8.6; 5 TABLET ORAL at 08:31

## 2019-12-16 RX ADMIN — SODIUM CHLORIDE, PRESERVATIVE FREE 10 ML: 5 INJECTION INTRAVENOUS at 08:32

## 2019-12-16 RX ADMIN — LEVOTHYROXINE SODIUM 50 MCG: 50 TABLET ORAL at 06:40

## 2019-12-16 RX ADMIN — ROSUVASTATIN CALCIUM 10 MG: 10 TABLET, FILM COATED ORAL at 08:31

## 2019-12-16 RX ADMIN — DOCUSATE SODIUM 100 MG: 100 CAPSULE, LIQUID FILLED ORAL at 08:31

## 2019-12-16 RX ADMIN — MINERAL SUPPLEMENT IRON 300 MG / 5 ML STRENGTH LIQUID 100 PER BOX UNFLAVORED 300 MG: at 08:30

## 2019-12-16 RX ADMIN — MAGNESIUM HYDROXIDE 30 ML: 400 SUSPENSION ORAL at 09:41

## 2019-12-16 RX ADMIN — BRIMONIDINE TARTRATE 1 DROP: 2 SOLUTION/ DROPS OPHTHALMIC at 08:31

## 2019-12-16 RX ADMIN — TIMOLOL MALEATE 1 DROP: 5 SOLUTION OPHTHALMIC at 08:31

## 2019-12-16 RX ADMIN — OMEGA-3-ACID ETHYL ESTERS 2 G: 1 CAPSULE, LIQUID FILLED ORAL at 08:31

## 2019-12-16 RX ADMIN — ENOXAPARIN SODIUM 40 MG: 40 INJECTION SUBCUTANEOUS at 08:30

## 2019-12-16 ASSESSMENT — PAIN DESCRIPTION - ORIENTATION
ORIENTATION: LOWER;MID
ORIENTATION: LOWER;MID

## 2019-12-16 ASSESSMENT — PAIN DESCRIPTION - PAIN TYPE
TYPE: SURGICAL PAIN;CHRONIC PAIN
TYPE: SURGICAL PAIN

## 2019-12-16 ASSESSMENT — PAIN - FUNCTIONAL ASSESSMENT
PAIN_FUNCTIONAL_ASSESSMENT: PREVENTS OR INTERFERES SOME ACTIVE ACTIVITIES AND ADLS
PAIN_FUNCTIONAL_ASSESSMENT: PREVENTS OR INTERFERES SOME ACTIVE ACTIVITIES AND ADLS

## 2019-12-16 ASSESSMENT — PAIN SCALES - GENERAL
PAINLEVEL_OUTOF10: 7
PAINLEVEL_OUTOF10: 7
PAINLEVEL_OUTOF10: 2

## 2019-12-16 ASSESSMENT — PAIN DESCRIPTION - FREQUENCY
FREQUENCY: CONTINUOUS
FREQUENCY: CONTINUOUS

## 2019-12-16 ASSESSMENT — PAIN DESCRIPTION - LOCATION
LOCATION: BACK
LOCATION: BACK

## 2019-12-16 ASSESSMENT — PAIN DESCRIPTION - DESCRIPTORS
DESCRIPTORS: ACHING;CONSTANT;DISCOMFORT;SORE;TENDER
DESCRIPTORS: ACHING;CONSTANT;SORE;TENDER

## 2019-12-16 ASSESSMENT — PAIN DESCRIPTION - ONSET
ONSET: AWAKENED FROM SLEEP
ONSET: AWAKENED FROM SLEEP

## 2019-12-26 DIAGNOSIS — M43.25 FUSION OF SPINE OF THORACOLUMBAR REGION: ICD-10-CM

## 2019-12-26 DIAGNOSIS — Z98.1 S/P LUMBAR FUSION: Primary | ICD-10-CM

## 2019-12-27 ENCOUNTER — OFFICE VISIT (OUTPATIENT)
Dept: NEUROSURGERY | Age: 79
End: 2019-12-27

## 2019-12-27 VITALS
WEIGHT: 143 LBS | BODY MASS INDEX: 25.33 KG/M2 | DIASTOLIC BLOOD PRESSURE: 59 MMHG | HEART RATE: 87 BPM | SYSTOLIC BLOOD PRESSURE: 113 MMHG

## 2019-12-27 DIAGNOSIS — M43.25 FUSION OF SPINE OF THORACOLUMBAR REGION: Primary | ICD-10-CM

## 2019-12-27 PROCEDURE — 99024 POSTOP FOLLOW-UP VISIT: CPT | Performed by: NEUROLOGICAL SURGERY

## 2020-02-10 ENCOUNTER — OFFICE VISIT (OUTPATIENT)
Dept: NEUROSURGERY | Age: 80
End: 2020-02-10

## 2020-02-10 ENCOUNTER — HOSPITAL ENCOUNTER (OUTPATIENT)
Age: 80
Discharge: HOME | End: 2020-02-12
Payer: MEDICARE

## 2020-02-10 ENCOUNTER — HOSPITAL ENCOUNTER (OUTPATIENT)
Dept: GENERAL RADIOLOGY | Age: 80
Discharge: HOME | End: 2020-02-12
Payer: MEDICARE

## 2020-02-10 VITALS
DIASTOLIC BLOOD PRESSURE: 72 MMHG | WEIGHT: 145 LBS | HEART RATE: 64 BPM | SYSTOLIC BLOOD PRESSURE: 120 MMHG | RESPIRATION RATE: 18 BRPM | BODY MASS INDEX: 25.69 KG/M2 | OXYGEN SATURATION: 98 % | HEIGHT: 63 IN

## 2020-02-10 PROCEDURE — 99024 POSTOP FOLLOW-UP VISIT: CPT | Performed by: NEUROLOGICAL SURGERY

## 2020-02-10 PROCEDURE — 72070 X-RAY EXAM THORAC SPINE 2VWS: CPT

## 2020-02-10 PROCEDURE — 72100 X-RAY EXAM L-S SPINE 2/3 VWS: CPT

## 2020-02-10 NOTE — PROGRESS NOTES
Hansel Pacheco is 70years old. She underwent T10-pelvis removal and reinsertion of hardware on 12/12/19 for treatment of akin fracture.      She is doing really well. She does not have any back pain. She is not using any pain medications.     She denies any new weakness or numbness.     Examination:  Moving all extremities well  Back incision clean, dry, and intact     I reviewed her follow-up thoracic and lumbar spine x-rays from February 10, 2020.   The hardware and alignment are stable.     Assessment:  S/p T10-pelvis removal and reinsertion of hardware on 12/12/19 for treatment of akin fracture.      Plan:  -Follow-up as needed in Neurosurgery clinic  -obtain bone stimulator as outpatient if she does not already have bone stimulator.   -Wear soft TLSO when out of bed for next 3 months    Electronically signed by Alta Hodgkin, MD on 2/10/2020 at 1:54 PM

## 2024-01-29 PROCEDURE — 88313 SPECIAL STAINS GROUP 2: CPT

## 2024-01-29 PROCEDURE — 88313 SPECIAL STAINS GROUP 2: CPT | Performed by: DERMATOLOGY

## 2024-01-29 PROCEDURE — 88305 TISSUE EXAM BY PATHOLOGIST: CPT | Performed by: DERMATOLOGY

## 2024-01-29 PROCEDURE — 88305 TISSUE EXAM BY PATHOLOGIST: CPT

## 2024-01-31 ENCOUNTER — LAB REQUISITION (OUTPATIENT)
Dept: DERMATOPATHOLOGY | Facility: CLINIC | Age: 84
End: 2024-01-31
Payer: MEDICARE

## 2024-01-31 DIAGNOSIS — D48.5 NEOPLASM OF UNCERTAIN BEHAVIOR OF SKIN: ICD-10-CM

## 2024-02-02 LAB
LABORATORY COMMENT REPORT: NORMAL
PATH REPORT.FINAL DX SPEC: NORMAL
PATH REPORT.GROSS SPEC: NORMAL
PATH REPORT.RELEVANT HX SPEC: NORMAL
PATH REPORT.TOTAL CANCER: NORMAL

## 2024-03-27 PROCEDURE — 88305 TISSUE EXAM BY PATHOLOGIST: CPT | Performed by: DERMATOLOGY

## 2024-03-29 ENCOUNTER — LAB REQUISITION (OUTPATIENT)
Dept: DERMATOPATHOLOGY | Facility: CLINIC | Age: 84
End: 2024-03-29
Payer: MEDICARE

## 2024-03-29 DIAGNOSIS — D48.5 NEOPLASM OF UNCERTAIN BEHAVIOR OF SKIN: ICD-10-CM

## 2024-06-23 ENCOUNTER — HOSPITAL ENCOUNTER (EMERGENCY)
Age: 84
Discharge: HOME | End: 2024-06-23
Attending: EMERGENCY MEDICINE
Payer: MEDICARE

## 2024-06-23 VITALS
WEIGHT: 125 LBS | BODY MASS INDEX: 22.15 KG/M2 | DIASTOLIC BLOOD PRESSURE: 74 MMHG | OXYGEN SATURATION: 95 % | TEMPERATURE: 97.1 F | HEIGHT: 63 IN | RESPIRATION RATE: 16 BRPM | HEART RATE: 100 BPM | SYSTOLIC BLOOD PRESSURE: 114 MMHG

## 2024-06-23 DIAGNOSIS — Z51.89 VISIT FOR WOUND CHECK: Primary | ICD-10-CM

## 2024-06-23 PROCEDURE — 99282 EMERGENCY DEPT VISIT SF MDM: CPT

## 2024-06-23 ASSESSMENT — PAIN DESCRIPTION - DESCRIPTORS: DESCRIPTORS: DISCOMFORT

## 2024-06-23 ASSESSMENT — LIFESTYLE VARIABLES
HOW MANY STANDARD DRINKS CONTAINING ALCOHOL DO YOU HAVE ON A TYPICAL DAY: PATIENT DOES NOT DRINK
HOW OFTEN DO YOU HAVE A DRINK CONTAINING ALCOHOL: NEVER

## 2024-06-23 ASSESSMENT — PAIN - FUNCTIONAL ASSESSMENT: PAIN_FUNCTIONAL_ASSESSMENT: 0-10

## 2024-06-23 ASSESSMENT — PAIN DESCRIPTION - ORIENTATION: ORIENTATION: RIGHT

## 2024-06-23 ASSESSMENT — PAIN SCALES - GENERAL: PAINLEVEL_OUTOF10: 8

## 2024-06-23 ASSESSMENT — PAIN DESCRIPTION - LOCATION: LOCATION: ARM

## 2024-06-24 NOTE — ED PROVIDER NOTES
Affect    DIAGNOSTIC RESULTS   LABS:    Labs Reviewed - No data to display    As interpreted by me, the above displayed labs are abnormal. All other labs obtained during this visit were within normal range or not returned as of this dictation.    RADIOLOGY:   Non-plain film images such as CT, Ultrasound and MRI are read by the radiologist. Plain radiographic images are visualized and preliminarily interpreted by the ED Provider with the below findings:    Interpretation per the Radiologist below, if available at the time of this note:    No orders to display     No results found.    No results found.    PROCEDURES   Unless otherwise noted below, none     CRITICAL CARE TIME (.cct)   Per attending attestation    EMERGENCY DEPARTMENT COURSE    Vitals:    Vitals:    06/23/24 1322   BP: 114/74   Pulse: 100   Resp: 16   Temp: 97.1 °F (36.2 °C)   SpO2: 95%   Weight: 56.7 kg (125 lb)   Height: 1.6 m (5' 3\")       Patient was given the following medications:  Medications - No data to display      Is this patient to be included in the SEP-1 core measure due to severe sepsis or septic shock? No   Exclusion criteria - the patient is NOT to be included for SEP-1 Core Measure due to:  Infection is not suspected        Medical Decision Making/Differential Diagnosis:    CC/HPI Summary, Social Determinants of health, Records Reviewed, DDx, testing done/not done, ED Course, Reassessment, disposition considerations/shared decision making with patient, consults, disposition:            Chronic Conditions:   Past Medical History:   Diagnosis Date    Arthritis     Hypothyroidism        CONSULTS: (Who and What was discussed)  None    Discussion with Other Profesionals : None    Social Determinants : None    Records Reviewed : Outpatient Notes reviewed available care everywhere encounter summaries, patient status post right olecranon bursectomy.    CC/HPI Summary, DDx, ED Course, and Reassessment:   84 y.o. female who presents with

## 2025-03-11 PROCEDURE — 88305 TISSUE EXAM BY PATHOLOGIST: CPT | Performed by: DERMATOLOGY

## 2025-03-13 ENCOUNTER — LAB REQUISITION (OUTPATIENT)
Dept: DERMATOPATHOLOGY | Facility: CLINIC | Age: 85
End: 2025-03-13
Payer: MEDICARE

## 2025-03-13 DIAGNOSIS — D48.5 NEOPLASM OF UNCERTAIN BEHAVIOR OF SKIN: ICD-10-CM

## 2025-03-14 LAB
LABORATORY COMMENT REPORT: NORMAL
PATH REPORT.FINAL DX SPEC: NORMAL
PATH REPORT.GROSS SPEC: NORMAL
PATH REPORT.RELEVANT HX SPEC: NORMAL
PATH REPORT.TOTAL CANCER: NORMAL

## (undated) DEVICE — GOWN,SIRUS,FABRNF,XL,20/CS: Brand: MEDLINE

## (undated) DEVICE — 3M™ IOBAN™ 2 ANTIMICROBIAL INCISE DRAPE 6650EZ: Brand: IOBAN™ 2

## (undated) DEVICE — 3.0MM NEURO (MATCH HEAD) SOFT TOUCH

## (undated) DEVICE — SHEET,DRAPE,40X58,STERILE: Brand: MEDLINE

## (undated) DEVICE — THE MILL DISPOSABLE - MEDIUM

## (undated) DEVICE — SHEET, T, LAPAROTOMY, STERILE: Brand: MEDLINE

## (undated) DEVICE — Z CONVERTED USE 2275207 CLOTH PREP W7.5XL7.5IN 2% CHG SKIN ALC AND RNS FREE

## (undated) DEVICE — SET PROC W/ 250ML BOWL 30UM FLTR RESVR BRAT 2

## (undated) DEVICE — 1000 S-DRAPE TOWEL DRAPE 10/BX: Brand: STERI-DRAPE™

## (undated) DEVICE — SET SPINAL NEURO STNEU1

## (undated) DEVICE — DRAPE C ARM UNIV W41XL74IN CLR PLAS XR VELC CLSR POLY STRP

## (undated) DEVICE — BRUNNS CURRETTES

## (undated) DEVICE — CODMAN® SURGICAL PATTIES 1/2" X 1" (1.27CM X 2.54CM): Brand: CODMAN®

## (undated) DEVICE — INTENDED FOR TISSUE SEPARATION, AND OTHER PROCEDURES THAT REQUIRE A SHARP SURGICAL BLADE TO PUNCTURE OR CUT.: Brand: BARD-PARKER ® STAINLESS STEEL BLADES

## (undated) DEVICE — GLOVE ORANGE PI 7   MSG9070

## (undated) DEVICE — SKIN AFFIX SURG ADHESIVE 72/CS 0.55ML: Brand: MEDLINE

## (undated) DEVICE — SOLUTION IV IRRIG WATER 1000ML POUR BRL 2F7114

## (undated) DEVICE — CLOTH SURG PREP PREOPERATIVE CHLORHEXIDINE GLUC 2% READYPREP

## (undated) DEVICE — GRADUATE

## (undated) DEVICE — CHLORAPREP 26ML ORANGE

## (undated) DEVICE — TOTAL TRAY, DB, 100% SILI FOLEY, 16FR 10: Brand: MEDLINE

## (undated) DEVICE — GAUZE,SPONGE,4"X4",16PLY,XRAY,STRL,LF: Brand: MEDLINE

## (undated) DEVICE — GLOVE SURG SZ 75 L12IN FNGR THK79MIL GRN LTX FREE

## (undated) DEVICE — E-Z CLEAN, NON-STICK, PTFE COATED, ELECTROSURGICAL BLADE ELECTRODE, MODIFIED EXTENDED INSULATION, 2.5 INCH (6.35 CM): Brand: MEGADYNE

## (undated) DEVICE — READY WET SKIN SCRUB TRAY-LF: Brand: MEDLINE INDUSTRIES, INC.

## (undated) DEVICE — GLOVE ORANGE PI 7 1/2   MSG9075

## (undated) DEVICE — GLOVE SURG SZ 85 STD WHT LTX SYN POLYMER BEAD REINF ANTI RL

## (undated) DEVICE — JACKSON TABLE POSITIONER KIT: Brand: MEDLINE INDUSTRIES, INC.

## (undated) DEVICE — SPHERE EYE 1 MRK GUIDANCE PASS STEALTHSTATION 1PK/EA

## (undated) DEVICE — BIPOLAR SEALER 23-301-1 AQM MBS: Brand: AQUAMANTYS™

## (undated) DEVICE — SOLUTION IV IRRIG POUR BRL 0.9% SODIUM CHL 2F7124

## (undated) DEVICE — EXTRAS UGOKWE

## (undated) DEVICE — DOUBLE BASIN SET: Brand: MEDLINE INDUSTRIES, INC.

## (undated) DEVICE — APPLICATOR PREP 26ML 0.7% IOD POVACRYLEX 74% ISO ALC ST

## (undated) DEVICE — TUBING SUCT 12FR MAL ALUM SHFT FN CAP VENT UNIV CONN W/ OBT

## (undated) DEVICE — KIT EVAC 400CC DIA1/8IN H PAT 12.5IN 3 SPR RND SHP PVC DRN

## (undated) DEVICE — SET SCREW 5540030 5.5 TI NS BRK OFF
Type: IMPLANTABLE DEVICE | Site: BACK | Status: NON-FUNCTIONAL
Brand: CD HORIZON® SPINAL SYSTEM

## (undated) DEVICE — 4-PORT MANIFOLD: Brand: NEPTUNE 2

## (undated) DEVICE — KIT POS W/ FOAM ARM CRADL SHEARGUARD CHST PD CVR FOR SPNL

## (undated) DEVICE — NEEDLE HYPO 25GA L1.5IN BLU POLYPR HUB S STL REG BVL STR

## (undated) DEVICE — MARKER,SKIN,WI/RULER AND LABELS: Brand: MEDLINE

## (undated) DEVICE — BAG TRNSF AUTOLGS SUCT AND ANTICOAG LN AUTOLOG

## (undated) DEVICE — SYRINGE MED 10ML LUERLOCK TIP W/O SFTY DISP

## (undated) DEVICE — SURGICAL PROCEDURE PACK LAMINECTOMY LUMBAR

## (undated) DEVICE — GLOVE SURG SZ 65 THK91MIL LTX FREE SYN POLYISOPRENE

## (undated) DEVICE — PATIENT RETURN ELECTRODE, SINGLE-USE, CONTACT QUALITY MONITORING, ADULT, WITH 9FT CORD, FOR PATIENTS WEIGING OVER 33LBS. (15KG): Brand: MEGADYNE

## (undated) DEVICE — DRILL SYSTEM 7

## (undated) DEVICE — 3.0MM PRECISION NEURO (MATCH HEAD)

## (undated) DEVICE — 5.0MM ROUND FLUTED

## (undated) DEVICE — PEN: MARKING STD 100/CS: Brand: MEDICAL ACTION INDUSTRIES

## (undated) DEVICE — RESERVOIR CARDOTMY C4L HARDSHELL W/ 40UM FLTR EL SER 4 PRT

## (undated) DEVICE — LABEL MED 4 IN SURG PANEL W/ PEN STRL

## (undated) DEVICE — Z DISCONTINUED PER MEDLINE DRESSING FOAM W6XL6IN W/ W4.25XL4.25IN PD SIL SFT SELF ADH

## (undated) DEVICE — LOCATOR RAD PASS SPHR MRK NAVIGATION BALL DISP STLTH STN

## (undated) DEVICE — ELECTRODE PT RET AD L9FT HI MOIST COND ADH HYDRGEL CORDED

## (undated) DEVICE — TOWEL,OR,DSP,ST,BLUE,DLX,10/PK,8PK/CS: Brand: MEDLINE

## (undated) DEVICE — GLOVE ORANGE PI 8   MSG9080

## (undated) DEVICE — COVER,TABLE,60X90,STERILE: Brand: MEDLINE

## (undated) DEVICE — COVER XR CASS W21XL40IN UNIV ADH MICROSHIELD

## (undated) DEVICE — GOWN,SIRUS,FABRNF,L,20/CS: Brand: MEDLINE

## (undated) DEVICE — SYRINGE IRRIG 60ML SFT PLIABLE BLB EZ TO GRP 1 HND USE W/

## (undated) DEVICE — Device

## (undated) DEVICE — GARMENT,MEDLINE,DVT,INT,CALF,MED, GEN2: Brand: MEDLINE

## (undated) DEVICE — PACK,UNIV, II AURORA: Brand: MEDLINE

## (undated) DEVICE — UNIVERSAL PACK: Brand: CONVERTORS

## (undated) DEVICE — 1.5L THIN WALL CAN: Brand: CRD